# Patient Record
Sex: FEMALE | Race: BLACK OR AFRICAN AMERICAN | Employment: STUDENT | URBAN - METROPOLITAN AREA
[De-identification: names, ages, dates, MRNs, and addresses within clinical notes are randomized per-mention and may not be internally consistent; named-entity substitution may affect disease eponyms.]

---
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
## 2019-01-05 ENCOUNTER — HOSPITAL ENCOUNTER (EMERGENCY)
Age: 19
Discharge: HOME | End: 2019-01-05
Attending: EMERGENCY MEDICINE
Payer: COMMERCIAL

## 2019-01-05 VITALS
OXYGEN SATURATION: 100 % | HEART RATE: 86 BPM | HEIGHT: 61 IN | BODY MASS INDEX: 37.76 KG/M2 | WEIGHT: 200 LBS | TEMPERATURE: 96.9 F | DIASTOLIC BLOOD PRESSURE: 106 MMHG | SYSTOLIC BLOOD PRESSURE: 164 MMHG

## 2019-01-05 DIAGNOSIS — R10.13 ABDOMINAL PAIN, EPIGASTRIC: Primary | ICD-10-CM

## 2019-01-05 LAB
ALBUMIN SERPL-MCNC: 4.5 G/DL (ref 3.5–5.2)
ALP BLD-CCNC: 68 U/L (ref 35–104)
ALT SERPL-CCNC: 12 U/L (ref 0–32)
ANION GAP SERPL CALCULATED.3IONS-SCNC: 12 MMOL/L (ref 7–16)
AST SERPL-CCNC: 25 U/L (ref 0–31)
BASOPHILS ABSOLUTE: 0.05 E9/L (ref 0–0.2)
BASOPHILS RELATIVE PERCENT: 0.6 % (ref 0–2)
BILIRUB SERPL-MCNC: 0.3 MG/DL (ref 0–1.2)
BILIRUBIN URINE: NEGATIVE
BLOOD, URINE: NEGATIVE
BUN BLDV-MCNC: 6 MG/DL (ref 6–20)
CALCIUM SERPL-MCNC: 9.5 MG/DL (ref 8.6–10.2)
CHLORIDE BLD-SCNC: 102 MMOL/L (ref 98–107)
CHP ED QC CHECK: YES
CLARITY: CLEAR
CO2: 23 MMOL/L (ref 22–29)
COLOR: YELLOW
CREAT SERPL-MCNC: 0.6 MG/DL (ref 0.5–1)
EOSINOPHILS ABSOLUTE: 0.07 E9/L (ref 0.05–0.5)
EOSINOPHILS RELATIVE PERCENT: 0.9 % (ref 0–6)
GFR AFRICAN AMERICAN: >60
GFR NON-AFRICAN AMERICAN: >60 ML/MIN/1.73
GLUCOSE BLD-MCNC: 99 MG/DL (ref 74–99)
GLUCOSE URINE: NEGATIVE MG/DL
HCT VFR BLD CALC: 36.6 % (ref 34–48)
HEMOGLOBIN: 12.1 G/DL (ref 11.5–15.5)
IMMATURE GRANULOCYTES #: 0.02 E9/L
IMMATURE GRANULOCYTES %: 0.3 % (ref 0–5)
KETONES, URINE: NEGATIVE MG/DL
LACTIC ACID: 1.3 MMOL/L (ref 0.5–2.2)
LEUKOCYTE ESTERASE, URINE: NEGATIVE
LIPASE: 16 U/L (ref 13–60)
LYMPHOCYTES ABSOLUTE: 2.71 E9/L (ref 1.5–4)
LYMPHOCYTES RELATIVE PERCENT: 34.7 % (ref 20–42)
MCH RBC QN AUTO: 26.7 PG (ref 26–35)
MCHC RBC AUTO-ENTMCNC: 33.1 % (ref 32–34.5)
MCV RBC AUTO: 80.6 FL (ref 80–99.9)
MONOCYTES ABSOLUTE: 0.54 E9/L (ref 0.1–0.95)
MONOCYTES RELATIVE PERCENT: 6.9 % (ref 2–12)
NEUTROPHILS ABSOLUTE: 4.43 E9/L (ref 1.8–7.3)
NEUTROPHILS RELATIVE PERCENT: 56.6 % (ref 43–80)
NITRITE, URINE: NEGATIVE
PDW BLD-RTO: 13.5 FL (ref 11.5–15)
PH UA: 6 (ref 5–9)
PLATELET # BLD: 381 E9/L (ref 130–450)
PMV BLD AUTO: 9.8 FL (ref 7–12)
POTASSIUM SERPL-SCNC: 5.2 MMOL/L (ref 3.5–5)
PREGNANCY TEST URINE, POC: NEGATIVE
PROTEIN UA: NEGATIVE MG/DL
RBC # BLD: 4.54 E12/L (ref 3.5–5.5)
SODIUM BLD-SCNC: 137 MMOL/L (ref 132–146)
SPECIFIC GRAVITY UA: 1.01 (ref 1–1.03)
TOTAL PROTEIN: 8.1 G/DL (ref 6.4–8.3)
UROBILINOGEN, URINE: 0.2 E.U./DL
WBC # BLD: 7.8 E9/L (ref 4.5–11.5)

## 2019-01-05 PROCEDURE — 85025 COMPLETE CBC W/AUTO DIFF WBC: CPT

## 2019-01-05 PROCEDURE — 36415 COLL VENOUS BLD VENIPUNCTURE: CPT

## 2019-01-05 PROCEDURE — 6370000000 HC RX 637 (ALT 250 FOR IP): Performed by: EMERGENCY MEDICINE

## 2019-01-05 PROCEDURE — 83690 ASSAY OF LIPASE: CPT

## 2019-01-05 PROCEDURE — 80053 COMPREHEN METABOLIC PANEL: CPT

## 2019-01-05 PROCEDURE — 81003 URINALYSIS AUTO W/O SCOPE: CPT

## 2019-01-05 PROCEDURE — 83605 ASSAY OF LACTIC ACID: CPT

## 2019-01-05 PROCEDURE — 99284 EMERGENCY DEPT VISIT MOD MDM: CPT

## 2019-01-05 RX ORDER — RIBOFLAVIN (VITAMIN B2) 100 MG
100 TABLET ORAL DAILY
COMMUNITY
End: 2019-10-11

## 2019-01-05 RX ORDER — SUCRALFATE 1 G/1
1 TABLET ORAL 4 TIMES DAILY
Qty: 60 TABLET | Refills: 0 | Status: SHIPPED | OUTPATIENT
Start: 2019-01-05 | End: 2019-10-11

## 2019-01-05 RX ORDER — FAMOTIDINE 20 MG/1
20 TABLET, FILM COATED ORAL 2 TIMES DAILY
Qty: 28 TABLET | Refills: 0 | Status: SHIPPED | OUTPATIENT
Start: 2019-01-05 | End: 2019-10-25

## 2019-01-05 RX ADMIN — LIDOCAINE HYDROCHLORIDE: 20 SOLUTION ORAL; TOPICAL at 20:36

## 2019-01-05 ASSESSMENT — PAIN DESCRIPTION - DESCRIPTORS: DESCRIPTORS: BURNING

## 2019-01-05 ASSESSMENT — PAIN SCALES - GENERAL: PAINLEVEL_OUTOF10: 3

## 2019-01-08 ENCOUNTER — APPOINTMENT (OUTPATIENT)
Dept: GENERAL RADIOLOGY | Age: 19
End: 2019-01-08
Payer: COMMERCIAL

## 2019-01-08 ENCOUNTER — HOSPITAL ENCOUNTER (EMERGENCY)
Age: 19
Discharge: HOME | End: 2019-01-08
Attending: EMERGENCY MEDICINE
Payer: COMMERCIAL

## 2019-01-08 VITALS
HEIGHT: 61 IN | WEIGHT: 200 LBS | DIASTOLIC BLOOD PRESSURE: 98 MMHG | BODY MASS INDEX: 37.76 KG/M2 | OXYGEN SATURATION: 99 % | SYSTOLIC BLOOD PRESSURE: 152 MMHG | TEMPERATURE: 96.9 F | RESPIRATION RATE: 18 BRPM | HEART RATE: 98 BPM

## 2019-01-08 DIAGNOSIS — K21.9 GASTROESOPHAGEAL REFLUX DISEASE, ESOPHAGITIS PRESENCE NOT SPECIFIED: Primary | ICD-10-CM

## 2019-01-08 LAB
ALBUMIN SERPL-MCNC: 4.1 G/DL (ref 3.5–5.2)
ALP BLD-CCNC: 78 U/L (ref 35–104)
ALT SERPL-CCNC: 8 U/L (ref 0–32)
ANION GAP SERPL CALCULATED.3IONS-SCNC: 14 MMOL/L (ref 7–16)
AST SERPL-CCNC: 11 U/L (ref 0–31)
BASOPHILS ABSOLUTE: 0.03 E9/L (ref 0–0.2)
BASOPHILS RELATIVE PERCENT: 0.5 % (ref 0–2)
BILIRUB SERPL-MCNC: 0.2 MG/DL (ref 0–1.2)
BUN BLDV-MCNC: 5 MG/DL (ref 6–20)
CALCIUM SERPL-MCNC: 9.2 MG/DL (ref 8.6–10.2)
CHLORIDE BLD-SCNC: 101 MMOL/L (ref 98–107)
CHP ED QC CHECK: YES
CO2: 21 MMOL/L (ref 22–29)
CREAT SERPL-MCNC: 0.6 MG/DL (ref 0.5–1)
D DIMER: 212 NG/ML DDU
EKG ATRIAL RATE: 89 BPM
EKG P AXIS: 65 DEGREES
EKG P-R INTERVAL: 152 MS
EKG Q-T INTERVAL: 328 MS
EKG QRS DURATION: 70 MS
EKG QTC CALCULATION (BAZETT): 399 MS
EKG R AXIS: 58 DEGREES
EKG T AXIS: 67 DEGREES
EKG VENTRICULAR RATE: 89 BPM
EOSINOPHILS ABSOLUTE: 0.04 E9/L (ref 0.05–0.5)
EOSINOPHILS RELATIVE PERCENT: 0.6 % (ref 0–6)
GFR AFRICAN AMERICAN: >60
GFR NON-AFRICAN AMERICAN: >60 ML/MIN/1.73
GLUCOSE BLD-MCNC: 126 MG/DL (ref 74–99)
HCT VFR BLD CALC: 35.1 % (ref 34–48)
HEMOGLOBIN: 11.7 G/DL (ref 11.5–15.5)
IMMATURE GRANULOCYTES #: 0.01 E9/L
IMMATURE GRANULOCYTES %: 0.2 % (ref 0–5)
LYMPHOCYTES ABSOLUTE: 1.8 E9/L (ref 1.5–4)
LYMPHOCYTES RELATIVE PERCENT: 27.1 % (ref 20–42)
MCH RBC QN AUTO: 27 PG (ref 26–35)
MCHC RBC AUTO-ENTMCNC: 33.3 % (ref 32–34.5)
MCV RBC AUTO: 81.1 FL (ref 80–99.9)
MONOCYTES ABSOLUTE: 0.49 E9/L (ref 0.1–0.95)
MONOCYTES RELATIVE PERCENT: 7.4 % (ref 2–12)
NEUTROPHILS ABSOLUTE: 4.28 E9/L (ref 1.8–7.3)
NEUTROPHILS RELATIVE PERCENT: 64.2 % (ref 43–80)
PDW BLD-RTO: 13.2 FL (ref 11.5–15)
PLATELET # BLD: 383 E9/L (ref 130–450)
PMV BLD AUTO: 9.9 FL (ref 7–12)
POTASSIUM SERPL-SCNC: 4 MMOL/L (ref 3.5–5)
PREGNANCY TEST URINE, POC: NEGATIVE
RBC # BLD: 4.33 E12/L (ref 3.5–5.5)
SODIUM BLD-SCNC: 136 MMOL/L (ref 132–146)
TOTAL PROTEIN: 7.4 G/DL (ref 6.4–8.3)
TROPONIN: <0.01 NG/ML (ref 0–0.03)
WBC # BLD: 6.7 E9/L (ref 4.5–11.5)

## 2019-01-08 PROCEDURE — 71046 X-RAY EXAM CHEST 2 VIEWS: CPT

## 2019-01-08 PROCEDURE — 36415 COLL VENOUS BLD VENIPUNCTURE: CPT

## 2019-01-08 PROCEDURE — 93005 ELECTROCARDIOGRAM TRACING: CPT | Performed by: PHYSICIAN ASSISTANT

## 2019-01-08 PROCEDURE — 80053 COMPREHEN METABOLIC PANEL: CPT

## 2019-01-08 PROCEDURE — 99285 EMERGENCY DEPT VISIT HI MDM: CPT

## 2019-01-08 PROCEDURE — 6370000000 HC RX 637 (ALT 250 FOR IP): Performed by: EMERGENCY MEDICINE

## 2019-01-08 PROCEDURE — 84484 ASSAY OF TROPONIN QUANT: CPT

## 2019-01-08 PROCEDURE — 85378 FIBRIN DEGRADE SEMIQUANT: CPT

## 2019-01-08 PROCEDURE — 85025 COMPLETE CBC W/AUTO DIFF WBC: CPT

## 2019-01-08 RX ADMIN — LIDOCAINE HYDROCHLORIDE: 20 SOLUTION ORAL; TOPICAL at 14:58

## 2019-01-08 ASSESSMENT — PAIN DESCRIPTION - LOCATION: LOCATION: CHEST

## 2019-01-08 ASSESSMENT — PAIN SCALES - GENERAL: PAINLEVEL_OUTOF10: 5

## 2019-01-08 ASSESSMENT — PAIN DESCRIPTION - DESCRIPTORS: DESCRIPTORS: ACHING

## 2019-01-08 ASSESSMENT — PAIN DESCRIPTION - PROGRESSION: CLINICAL_PROGRESSION: GRADUALLY WORSENING

## 2019-01-08 ASSESSMENT — PAIN DESCRIPTION - ORIENTATION: ORIENTATION: MID

## 2019-01-08 ASSESSMENT — PAIN DESCRIPTION - PAIN TYPE: TYPE: ACUTE PAIN

## 2019-01-08 ASSESSMENT — PAIN DESCRIPTION - FREQUENCY: FREQUENCY: INTERMITTENT

## 2019-03-07 ENCOUNTER — HOSPITAL ENCOUNTER (OUTPATIENT)
Age: 19
Discharge: HOME | End: 2019-03-09

## 2019-03-07 LAB — HCG QUALITATIVE: NEGATIVE

## 2019-03-07 PROCEDURE — 84703 CHORIONIC GONADOTROPIN ASSAY: CPT

## 2019-03-14 ENCOUNTER — HOSPITAL ENCOUNTER (OUTPATIENT)
Age: 19
Discharge: HOME | End: 2019-03-16

## 2019-03-14 PROCEDURE — 88304 TISSUE EXAM BY PATHOLOGIST: CPT

## 2019-10-05 ENCOUNTER — APPOINTMENT (OUTPATIENT)
Dept: GENERAL RADIOLOGY | Age: 19
End: 2019-10-05
Payer: COMMERCIAL

## 2019-10-05 ENCOUNTER — HOSPITAL ENCOUNTER (EMERGENCY)
Age: 19
Discharge: HOME | End: 2019-10-05
Payer: COMMERCIAL

## 2019-10-05 VITALS
OXYGEN SATURATION: 99 % | WEIGHT: 200 LBS | HEART RATE: 90 BPM | DIASTOLIC BLOOD PRESSURE: 92 MMHG | SYSTOLIC BLOOD PRESSURE: 148 MMHG | RESPIRATION RATE: 18 BRPM | BODY MASS INDEX: 37.79 KG/M2 | TEMPERATURE: 98 F

## 2019-10-05 DIAGNOSIS — S82.141A TIBIAL PLATEAU FRACTURE, RIGHT, CLOSED, INITIAL ENCOUNTER: Primary | ICD-10-CM

## 2019-10-05 PROCEDURE — 73562 X-RAY EXAM OF KNEE 3: CPT

## 2019-10-05 PROCEDURE — 6370000000 HC RX 637 (ALT 250 FOR IP): Performed by: NURSE PRACTITIONER

## 2019-10-05 PROCEDURE — 99283 EMERGENCY DEPT VISIT LOW MDM: CPT

## 2019-10-05 RX ORDER — ACETAMINOPHEN AND CODEINE PHOSPHATE 300; 30 MG/1; MG/1
1 TABLET ORAL EVERY 6 HOURS PRN
Qty: 18 TABLET | Refills: 0 | Status: SHIPPED | OUTPATIENT
Start: 2019-10-05 | End: 2019-10-08

## 2019-10-05 RX ORDER — ACETAMINOPHEN 500 MG
1000 TABLET ORAL ONCE
Status: COMPLETED | OUTPATIENT
Start: 2019-10-05 | End: 2019-10-05

## 2019-10-05 RX ADMIN — ACETAMINOPHEN 1000 MG: 500 TABLET ORAL at 10:01

## 2019-10-05 ASSESSMENT — PAIN SCALES - GENERAL
PAINLEVEL_OUTOF10: 6
PAINLEVEL_OUTOF10: 7

## 2019-10-07 ENCOUNTER — TELEPHONE (OUTPATIENT)
Dept: ORTHOPEDIC SURGERY | Age: 19
End: 2019-10-07

## 2019-10-11 ENCOUNTER — OFFICE VISIT (OUTPATIENT)
Dept: ORTHOPEDIC SURGERY | Age: 19
End: 2019-10-11
Payer: COMMERCIAL

## 2019-10-11 VITALS
BODY MASS INDEX: 36.8 KG/M2 | DIASTOLIC BLOOD PRESSURE: 89 MMHG | SYSTOLIC BLOOD PRESSURE: 136 MMHG | WEIGHT: 200 LBS | HEIGHT: 62 IN

## 2019-10-11 DIAGNOSIS — M25.561 ACUTE PAIN OF RIGHT KNEE: ICD-10-CM

## 2019-10-11 PROCEDURE — G8417 CALC BMI ABV UP PARAM F/U: HCPCS | Performed by: ORTHOPAEDIC SURGERY

## 2019-10-11 PROCEDURE — 99203 OFFICE O/P NEW LOW 30 MIN: CPT | Performed by: ORTHOPAEDIC SURGERY

## 2019-10-11 PROCEDURE — 1036F TOBACCO NON-USER: CPT | Performed by: ORTHOPAEDIC SURGERY

## 2019-10-11 PROCEDURE — G8427 DOCREV CUR MEDS BY ELIG CLIN: HCPCS | Performed by: ORTHOPAEDIC SURGERY

## 2019-10-11 PROCEDURE — G8484 FLU IMMUNIZE NO ADMIN: HCPCS | Performed by: ORTHOPAEDIC SURGERY

## 2019-10-11 PROCEDURE — 99202 OFFICE O/P NEW SF 15 MIN: CPT | Performed by: ORTHOPAEDIC SURGERY

## 2019-10-22 DIAGNOSIS — M25.561 ACUTE PAIN OF RIGHT KNEE: Primary | ICD-10-CM

## 2019-10-25 ENCOUNTER — HOSPITAL ENCOUNTER (OUTPATIENT)
Dept: GENERAL RADIOLOGY | Age: 19
Discharge: HOME | End: 2019-10-27
Payer: COMMERCIAL

## 2019-10-25 ENCOUNTER — OFFICE VISIT (OUTPATIENT)
Dept: ORTHOPEDIC SURGERY | Age: 19
End: 2019-10-25
Payer: COMMERCIAL

## 2019-10-25 VITALS — RESPIRATION RATE: 18 BRPM | HEIGHT: 62 IN | WEIGHT: 200 LBS | BODY MASS INDEX: 36.8 KG/M2

## 2019-10-25 DIAGNOSIS — S82.131A: Primary | ICD-10-CM

## 2019-10-25 DIAGNOSIS — M25.561 ACUTE PAIN OF RIGHT KNEE: ICD-10-CM

## 2019-10-25 PROCEDURE — 99203 OFFICE O/P NEW LOW 30 MIN: CPT | Performed by: ORTHOPAEDIC SURGERY

## 2019-10-25 PROCEDURE — 73560 X-RAY EXAM OF KNEE 1 OR 2: CPT

## 2019-10-25 PROCEDURE — G8417 CALC BMI ABV UP PARAM F/U: HCPCS | Performed by: ORTHOPAEDIC SURGERY

## 2019-10-25 PROCEDURE — 1036F TOBACCO NON-USER: CPT | Performed by: ORTHOPAEDIC SURGERY

## 2019-10-25 PROCEDURE — 99212 OFFICE O/P EST SF 10 MIN: CPT | Performed by: ORTHOPAEDIC SURGERY

## 2019-10-25 PROCEDURE — G8427 DOCREV CUR MEDS BY ELIG CLIN: HCPCS | Performed by: ORTHOPAEDIC SURGERY

## 2019-10-25 PROCEDURE — G8484 FLU IMMUNIZE NO ADMIN: HCPCS | Performed by: ORTHOPAEDIC SURGERY

## 2019-10-29 PROBLEM — S82.131A: Status: ACTIVE | Noted: 2019-10-29

## 2020-01-27 ENCOUNTER — HOSPITAL ENCOUNTER (OUTPATIENT)
Dept: GENERAL RADIOLOGY | Age: 20
Discharge: HOME | End: 2020-01-29
Payer: MEDICAID

## 2020-01-27 ENCOUNTER — OFFICE VISIT (OUTPATIENT)
Dept: ORTHOPEDIC SURGERY | Age: 20
End: 2020-01-27
Payer: MEDICAID

## 2020-01-27 VITALS
DIASTOLIC BLOOD PRESSURE: 84 MMHG | RESPIRATION RATE: 18 BRPM | HEIGHT: 62 IN | BODY MASS INDEX: 36.8 KG/M2 | SYSTOLIC BLOOD PRESSURE: 122 MMHG | HEART RATE: 83 BPM | WEIGHT: 200 LBS

## 2020-01-27 PROCEDURE — 73562 X-RAY EXAM OF KNEE 3: CPT

## 2020-01-27 PROCEDURE — 99212 OFFICE O/P EST SF 10 MIN: CPT | Performed by: PHYSICIAN ASSISTANT

## 2020-01-27 PROCEDURE — 99213 OFFICE O/P EST LOW 20 MIN: CPT | Performed by: NURSE PRACTITIONER

## 2020-01-27 PROCEDURE — 1036F TOBACCO NON-USER: CPT | Performed by: NURSE PRACTITIONER

## 2020-01-27 PROCEDURE — G8427 DOCREV CUR MEDS BY ELIG CLIN: HCPCS | Performed by: NURSE PRACTITIONER

## 2020-01-27 PROCEDURE — G8484 FLU IMMUNIZE NO ADMIN: HCPCS | Performed by: NURSE PRACTITIONER

## 2020-01-27 PROCEDURE — G8417 CALC BMI ABV UP PARAM F/U: HCPCS | Performed by: NURSE PRACTITIONER

## 2020-01-31 NOTE — PATIENT INSTRUCTIONS
Continue activity and weight bearing as tolerated. OTC analgesics as needed for pain control  Ice and elevation if needed for pain or swelling control  Follow up in 3 months, anticipate this being your last appointment if you continue to do well.    Call sooner with any problems or ocncerns

## 2020-04-08 ENCOUNTER — TELEPHONE (OUTPATIENT)
Dept: ORTHOPEDIC SURGERY | Age: 20
End: 2020-04-08

## 2020-04-08 NOTE — TELEPHONE ENCOUNTER
Called patient to reschedule appointment for 4/30 due to COVID-19 virus, spoke to her mother Rosey Ward. Will call back to reschedule appointment when restrictions are lifted. She verbalized understanding and encouraged to call office with any concerns.

## 2020-06-20 ENCOUNTER — HOSPITAL ENCOUNTER (EMERGENCY)
Age: 20
Discharge: HOME | End: 2020-06-20
Attending: EMERGENCY MEDICINE
Payer: MEDICAID

## 2020-06-20 ENCOUNTER — APPOINTMENT (OUTPATIENT)
Dept: GENERAL RADIOLOGY | Age: 20
End: 2020-06-20
Payer: MEDICAID

## 2020-06-20 VITALS
DIASTOLIC BLOOD PRESSURE: 80 MMHG | HEART RATE: 97 BPM | OXYGEN SATURATION: 98 % | TEMPERATURE: 97.1 F | SYSTOLIC BLOOD PRESSURE: 118 MMHG | RESPIRATION RATE: 14 BRPM

## 2020-06-20 LAB
ALBUMIN SERPL-MCNC: 4.4 G/DL (ref 3.5–5.2)
ALP BLD-CCNC: 126 U/L (ref 35–104)
ALT SERPL-CCNC: 5 U/L (ref 0–32)
ANION GAP SERPL CALCULATED.3IONS-SCNC: 17 MMOL/L (ref 7–16)
AST SERPL-CCNC: 7 U/L (ref 0–31)
BASOPHILS ABSOLUTE: 0.03 E9/L (ref 0–0.2)
BASOPHILS RELATIVE PERCENT: 0.4 % (ref 0–2)
BETA-HYDROXYBUTYRATE: 3.09 MMOL/L (ref 0.02–0.27)
BILIRUB SERPL-MCNC: 0.2 MG/DL (ref 0–1.2)
BILIRUBIN URINE: NEGATIVE
BLOOD, URINE: NEGATIVE
BUN BLDV-MCNC: 8 MG/DL (ref 6–20)
CALCIUM SERPL-MCNC: 9.7 MG/DL (ref 8.6–10.2)
CHLORIDE BLD-SCNC: 99 MMOL/L (ref 98–107)
CHP ED QC CHECK: NORMAL
CLARITY: CLEAR
CO2: 18 MMOL/L (ref 22–29)
COLOR: ABNORMAL
CREAT SERPL-MCNC: 0.6 MG/DL (ref 0.5–1)
EOSINOPHILS ABSOLUTE: 0.05 E9/L (ref 0.05–0.5)
EOSINOPHILS RELATIVE PERCENT: 0.7 % (ref 0–6)
GFR AFRICAN AMERICAN: >60
GFR NON-AFRICAN AMERICAN: >60 ML/MIN/1.73
GLUCOSE BLD-MCNC: 376 MG/DL
GLUCOSE BLD-MCNC: 516 MG/DL (ref 74–99)
GLUCOSE URINE: >=1000 MG/DL
HCT VFR BLD CALC: 41.3 % (ref 34–48)
HEMOGLOBIN: 13.8 G/DL (ref 11.5–15.5)
IMMATURE GRANULOCYTES #: 0.04 E9/L
IMMATURE GRANULOCYTES %: 0.5 % (ref 0–5)
KETONES, URINE: >=80 MG/DL
LEUKOCYTE ESTERASE, URINE: NEGATIVE
LYMPHOCYTES ABSOLUTE: 2 E9/L (ref 1.5–4)
LYMPHOCYTES RELATIVE PERCENT: 26.1 % (ref 20–42)
MCH RBC QN AUTO: 26.9 PG (ref 26–35)
MCHC RBC AUTO-ENTMCNC: 33.4 % (ref 32–34.5)
MCV RBC AUTO: 80.5 FL (ref 80–99.9)
METER GLUCOSE: 376 MG/DL (ref 74–99)
MONOCYTES ABSOLUTE: 0.61 E9/L (ref 0.1–0.95)
MONOCYTES RELATIVE PERCENT: 8 % (ref 2–12)
NEUTROPHILS ABSOLUTE: 4.94 E9/L (ref 1.8–7.3)
NEUTROPHILS RELATIVE PERCENT: 64.3 % (ref 43–80)
NITRITE, URINE: NEGATIVE
PDW BLD-RTO: 12.8 FL (ref 11.5–15)
PH UA: 6 (ref 5–9)
PH VENOUS: 7.32 (ref 7.35–7.45)
PLATELET # BLD: 352 E9/L (ref 130–450)
PMV BLD AUTO: 10.7 FL (ref 7–12)
POTASSIUM SERPL-SCNC: 4.7 MMOL/L (ref 3.5–5)
PROTEIN UA: NEGATIVE MG/DL
RBC # BLD: 5.13 E12/L (ref 3.5–5.5)
SODIUM BLD-SCNC: 134 MMOL/L (ref 132–146)
SPECIFIC GRAVITY UA: 1.01 (ref 1–1.03)
TOTAL PROTEIN: 7.9 G/DL (ref 6.4–8.3)
TROPONIN: <0.01 NG/ML (ref 0–0.03)
UROBILINOGEN, URINE: 0.2 E.U./DL
WBC # BLD: 7.7 E9/L (ref 4.5–11.5)

## 2020-06-20 PROCEDURE — 85025 COMPLETE CBC W/AUTO DIFF WBC: CPT

## 2020-06-20 PROCEDURE — 36415 COLL VENOUS BLD VENIPUNCTURE: CPT

## 2020-06-20 PROCEDURE — 96360 HYDRATION IV INFUSION INIT: CPT

## 2020-06-20 PROCEDURE — 6370000000 HC RX 637 (ALT 250 FOR IP): Performed by: EMERGENCY MEDICINE

## 2020-06-20 PROCEDURE — 82800 BLOOD PH: CPT

## 2020-06-20 PROCEDURE — 82962 GLUCOSE BLOOD TEST: CPT

## 2020-06-20 PROCEDURE — 71045 X-RAY EXAM CHEST 1 VIEW: CPT

## 2020-06-20 PROCEDURE — 96361 HYDRATE IV INFUSION ADD-ON: CPT

## 2020-06-20 PROCEDURE — 81003 URINALYSIS AUTO W/O SCOPE: CPT

## 2020-06-20 PROCEDURE — 87088 URINE BACTERIA CULTURE: CPT

## 2020-06-20 PROCEDURE — 80053 COMPREHEN METABOLIC PANEL: CPT

## 2020-06-20 PROCEDURE — 2580000003 HC RX 258: Performed by: EMERGENCY MEDICINE

## 2020-06-20 PROCEDURE — 93005 ELECTROCARDIOGRAM TRACING: CPT | Performed by: EMERGENCY MEDICINE

## 2020-06-20 PROCEDURE — 84484 ASSAY OF TROPONIN QUANT: CPT

## 2020-06-20 PROCEDURE — 82010 KETONE BODYS QUAN: CPT

## 2020-06-20 PROCEDURE — 99285 EMERGENCY DEPT VISIT HI MDM: CPT

## 2020-06-20 RX ORDER — 0.9 % SODIUM CHLORIDE 0.9 %
1000 INTRAVENOUS SOLUTION INTRAVENOUS ONCE
Status: COMPLETED | OUTPATIENT
Start: 2020-06-20 | End: 2020-06-20

## 2020-06-20 RX ADMIN — SODIUM CHLORIDE 1000 ML: 9 INJECTION, SOLUTION INTRAVENOUS at 18:20

## 2020-06-20 RX ADMIN — SODIUM CHLORIDE 1000 ML: 9 INJECTION, SOLUTION INTRAVENOUS at 16:53

## 2020-06-20 RX ADMIN — METFORMIN HYDROCHLORIDE 500 MG: 500 TABLET ORAL at 18:49

## 2020-06-20 RX ADMIN — LIDOCAINE HYDROCHLORIDE: 20 SOLUTION ORAL; TOPICAL at 15:56

## 2020-06-20 ASSESSMENT — ENCOUNTER SYMPTOMS
ABDOMINAL DISTENTION: 0
SINUS PRESSURE: 0
BACK PAIN: 0
COUGH: 0
EYE PAIN: 0
EYE DISCHARGE: 0
DIARRHEA: 0
VOMITING: 0
NAUSEA: 0
SHORTNESS OF BREATH: 0
SORE THROAT: 0
WHEEZING: 0
EYE REDNESS: 0

## 2020-06-20 ASSESSMENT — PAIN DESCRIPTION - PAIN TYPE: TYPE: ACUTE PAIN

## 2020-06-20 ASSESSMENT — PAIN SCALES - GENERAL: PAINLEVEL_OUTOF10: 6

## 2020-06-20 ASSESSMENT — PAIN DESCRIPTION - LOCATION: LOCATION: CHEST

## 2020-06-20 NOTE — ED PROVIDER NOTES
abdominal tenderness. There is no guarding or rebound. Comments: Epigastric palpation without rebound or guarding. Skin:     General: Skin is warm and dry. Neurological:      Mental Status: She is alert and oriented to person, place, and time. Cranial Nerves: No cranial nerve deficit. Coordination: Coordination normal.          Procedures     MDM  Number of Diagnoses or Management Options  Dehydration:   Hyperglycemia:   Ketosis due to diabetes [facility]):   Diagnosis management comments: 62-year-old female presenting with midsternal chest pain after eating today. She thought she had GERD was using over-the-counter medications to treat it. Patient did have some epigastric and midsternal chest tenderness to palpation. Initial EKG showed mild tachycardia but otherwise was unremarkable. However, given the patient's discussion of her medications, she does have a history of type 2 diabetes, presumed hypertension and hyperlipidemia, making her higher risk than the average 21year-old for coronary issue. Labs were obtained and was noted the patient was hyperglycemic. On further discussion, the patient admitted to not taking her metformin as scheduled and so further work-up was undertaken to make sure the patient was not converting to DKA. The patient was ketotic but not acidotic. Metformin was dosed per her home regimen, she has not taken any in the last day. Fluids were given and the patient's blood sugar improved. A1c was deferred to her PCP and it was recommended the patient follow-up with her PCP first thing on Monday morning, as medication nonadherence could cause her to have significantly high risk of morbidity and mortality the course of her lifetime. Patient was agreeable with this plan is to follow-up with her PCP on Monday. She is felt stable for discharge at this time.            --------------------------------------------- PAST HISTORY ---------------------------------------------  Past Medical History:  has a past medical history of Hypothyroid. Past Surgical History:  has no past surgical history on file. Social History:  reports that she has never smoked. She has never used smokeless tobacco. She reports that she does not drink alcohol or use drugs. Family History: family history is not on file. The patients home medications have been reviewed. Allergies: Patient has no known allergies.     -------------------------------------------------- RESULTS -------------------------------------------------  Labs:  Results for orders placed or performed during the hospital encounter of 06/20/20   CBC Auto Differential   Result Value Ref Range    WBC 7.7 4.5 - 11.5 E9/L    RBC 5.13 3.50 - 5.50 E12/L    Hemoglobin 13.8 11.5 - 15.5 g/dL    Hematocrit 41.3 34.0 - 48.0 %    MCV 80.5 80.0 - 99.9 fL    MCH 26.9 26.0 - 35.0 pg    MCHC 33.4 32.0 - 34.5 %    RDW 12.8 11.5 - 15.0 fL    Platelets 005 256 - 540 E9/L    MPV 10.7 7.0 - 12.0 fL    Neutrophils % 64.3 43.0 - 80.0 %    Immature Granulocytes % 0.5 0.0 - 5.0 %    Lymphocytes % 26.1 20.0 - 42.0 %    Monocytes % 8.0 2.0 - 12.0 %    Eosinophils % 0.7 0.0 - 6.0 %    Basophils % 0.4 0.0 - 2.0 %    Neutrophils Absolute 4.94 1.80 - 7.30 E9/L    Immature Granulocytes # 0.04 E9/L    Lymphocytes Absolute 2.00 1.50 - 4.00 E9/L    Monocytes Absolute 0.61 0.10 - 0.95 E9/L    Eosinophils Absolute 0.05 0.05 - 0.50 E9/L    Basophils Absolute 0.03 0.00 - 0.20 E9/L   Comprehensive Metabolic Panel   Result Value Ref Range    Sodium 134 132 - 146 mmol/L    Potassium 4.7 3.5 - 5.0 mmol/L    Chloride 99 98 - 107 mmol/L    CO2 18 (L) 22 - 29 mmol/L    Anion Gap 17 (H) 7 - 16 mmol/L    Glucose 516 (HH) 74 - 99 mg/dL    BUN 8 6 - 20 mg/dL    CREATININE 0.6 0.5 - 1.0 mg/dL    GFR Non-African American >60 >=60 mL/min/1.73    GFR African American >60     Calcium 9.7 8.6 - 10.2 mg/dL    Total Protein 7.9 6.4 - 8.3 g/dL Alb 4.4 3.5 - 5.2 g/dL    Total Bilirubin 0.2 0.0 - 1.2 mg/dL    Alkaline Phosphatase 126 (H) 35 - 104 U/L    ALT 5 0 - 32 U/L    AST 7 0 - 31 U/L   Troponin   Result Value Ref Range    Troponin <0.01 0.00 - 0.03 ng/mL   pH, venous   Result Value Ref Range    pH, Jasvir 7.32 (L) 7.35 - 7.45   Urinalysis, reflex to microscopic   Result Value Ref Range    Color, UA Straw Straw/Yellow    Clarity, UA Clear Clear    Glucose, Ur >=1000 (A) Negative mg/dL    Bilirubin Urine Negative Negative    Ketones, Urine >=80 (A) Negative mg/dL    Specific Gravity, UA 1.010 1.005 - 1.030    Blood, Urine Negative Negative    pH, UA 6.0 5.0 - 9.0    Protein, UA Negative Negative mg/dL    Urobilinogen, Urine 0.2 <2.0 E.U./dL    Nitrite, Urine Negative Negative    Leukocyte Esterase, Urine Negative Negative   Beta-Hydroxybutyrate   Result Value Ref Range    Beta-Hydroxybutyrate 3.09 (H) 0.02 - 0.27 mmol/L   POCT Glucose   Result Value Ref Range    Glucose 376 mg/dL    QC OK? ok    POCT Glucose   Result Value Ref Range    Meter Glucose 376 (H) 74 - 99 mg/dL   EKG 12 Lead   Result Value Ref Range    Ventricular Rate 101 BPM    Atrial Rate 101 BPM    P-R Interval 148 ms    QRS Duration 72 ms    Q-T Interval 318 ms    QTc Calculation (Bazett) 412 ms    P Axis 50 degrees    R Axis 50 degrees    T Axis 41 degrees       Radiology:  XR CHEST PORTABLE   Final Result   No acute cardiopulmonary abnormality                   EKG:  This EKG is signed and interpreted by me. Rate: 101  Rhythm: Sinus  Interpretation: sinus tachycardia  Comparison: stable as compared to patient's most recent EKG    ------------------------- NURSING NOTES AND VITALS REVIEWED ---------------------------  Date / Time Roomed:  6/20/2020  3:39 PM  ED Bed Assignment:  26/26    The nursing notes within the ED encounter and vital signs as below have been reviewed.    /80   Pulse 97   Temp 97.1 °F (36.2 °C) (Temporal)   Resp 14   SpO2 98%   Oxygen Saturation

## 2020-06-21 LAB
EKG ATRIAL RATE: 101 BPM
EKG P AXIS: 50 DEGREES
EKG P-R INTERVAL: 148 MS
EKG Q-T INTERVAL: 318 MS
EKG QRS DURATION: 72 MS
EKG QTC CALCULATION (BAZETT): 412 MS
EKG R AXIS: 50 DEGREES
EKG T AXIS: 41 DEGREES
EKG VENTRICULAR RATE: 101 BPM

## 2020-06-21 PROCEDURE — 93010 ELECTROCARDIOGRAM REPORT: CPT | Performed by: INTERNAL MEDICINE

## 2020-06-22 ENCOUNTER — HOSPITAL ENCOUNTER (OUTPATIENT)
Age: 20
Setting detail: OBSERVATION
Discharge: HOME | End: 2020-06-25
Attending: EMERGENCY MEDICINE | Admitting: INTERNAL MEDICINE
Payer: MEDICAID

## 2020-06-22 LAB
ALBUMIN SERPL-MCNC: 4.2 G/DL (ref 3.5–5.2)
ALP BLD-CCNC: 89 U/L (ref 35–104)
ALT SERPL-CCNC: <5 U/L (ref 0–32)
ANION GAP SERPL CALCULATED.3IONS-SCNC: 22 MMOL/L (ref 7–16)
AST SERPL-CCNC: 11 U/L (ref 0–31)
BACTERIA: ABNORMAL /HPF
BASOPHILS ABSOLUTE: 0.07 E9/L (ref 0–0.2)
BASOPHILS RELATIVE PERCENT: 0.7 % (ref 0–2)
BETA-HYDROXYBUTYRATE: >4.5 MMOL/L (ref 0.02–0.27)
BILIRUB SERPL-MCNC: 0.3 MG/DL (ref 0–1.2)
BILIRUBIN URINE: ABNORMAL
BLOOD, URINE: ABNORMAL
BUN BLDV-MCNC: 3 MG/DL (ref 6–20)
CALCIUM SERPL-MCNC: 9.1 MG/DL (ref 8.6–10.2)
CHLORIDE BLD-SCNC: 101 MMOL/L (ref 98–107)
CLARITY: CLEAR
CO2: 12 MMOL/L (ref 22–29)
COLOR: YELLOW
CREAT SERPL-MCNC: 0.5 MG/DL (ref 0.5–1)
EOSINOPHILS ABSOLUTE: 0.01 E9/L (ref 0.05–0.5)
EOSINOPHILS RELATIVE PERCENT: 0.1 % (ref 0–6)
EPITHELIAL CELLS, UA: ABNORMAL /HPF
GFR AFRICAN AMERICAN: >60
GFR NON-AFRICAN AMERICAN: >60 ML/MIN/1.73
GLUCOSE BLD-MCNC: 283 MG/DL (ref 74–99)
GLUCOSE URINE: 250 MG/DL
HCT VFR BLD CALC: 43.7 % (ref 34–48)
HEMOGLOBIN: 14.6 G/DL (ref 11.5–15.5)
IMMATURE GRANULOCYTES #: 0.05 E9/L
IMMATURE GRANULOCYTES %: 0.5 % (ref 0–5)
KETONES, URINE: >=80 MG/DL
LACTIC ACID: 1 MMOL/L (ref 0.5–2.2)
LEUKOCYTE ESTERASE, URINE: NEGATIVE
LIPASE: 16 U/L (ref 13–60)
LYMPHOCYTES ABSOLUTE: 1.66 E9/L (ref 1.5–4)
LYMPHOCYTES RELATIVE PERCENT: 17.6 % (ref 20–42)
MCH RBC QN AUTO: 26.7 PG (ref 26–35)
MCHC RBC AUTO-ENTMCNC: 33.4 % (ref 32–34.5)
MCV RBC AUTO: 80 FL (ref 80–99.9)
METER GLUCOSE: 282 MG/DL (ref 74–99)
MONOCYTES ABSOLUTE: 0.75 E9/L (ref 0.1–0.95)
MONOCYTES RELATIVE PERCENT: 8 % (ref 2–12)
NEUTROPHILS ABSOLUTE: 6.87 E9/L (ref 1.8–7.3)
NEUTROPHILS RELATIVE PERCENT: 73.1 % (ref 43–80)
NITRITE, URINE: NEGATIVE
PDW BLD-RTO: 13.2 FL (ref 11.5–15)
PH UA: 6 (ref 5–9)
PH VENOUS: 7.33 (ref 7.35–7.45)
PLATELET # BLD: 400 E9/L (ref 130–450)
PMV BLD AUTO: 10.4 FL (ref 7–12)
POTASSIUM SERPL-SCNC: 4 MMOL/L (ref 3.5–5)
PROTEIN UA: ABNORMAL MG/DL
RBC # BLD: 5.46 E12/L (ref 3.5–5.5)
RBC UA: ABNORMAL /HPF (ref 0–2)
SODIUM BLD-SCNC: 135 MMOL/L (ref 132–146)
SPECIFIC GRAVITY UA: >=1.03 (ref 1–1.03)
TOTAL PROTEIN: 7.5 G/DL (ref 6.4–8.3)
URINE CULTURE, ROUTINE: NORMAL
UROBILINOGEN, URINE: 0.2 E.U./DL
WBC # BLD: 9.4 E9/L (ref 4.5–11.5)
WBC UA: ABNORMAL /HPF (ref 0–5)

## 2020-06-22 PROCEDURE — 2580000003 HC RX 258: Performed by: NURSE PRACTITIONER

## 2020-06-22 PROCEDURE — 83690 ASSAY OF LIPASE: CPT

## 2020-06-22 PROCEDURE — 83605 ASSAY OF LACTIC ACID: CPT

## 2020-06-22 PROCEDURE — 80053 COMPREHEN METABOLIC PANEL: CPT

## 2020-06-22 PROCEDURE — 82962 GLUCOSE BLOOD TEST: CPT

## 2020-06-22 PROCEDURE — 99285 EMERGENCY DEPT VISIT HI MDM: CPT

## 2020-06-22 PROCEDURE — 81001 URINALYSIS AUTO W/SCOPE: CPT

## 2020-06-22 PROCEDURE — 87088 URINE BACTERIA CULTURE: CPT

## 2020-06-22 PROCEDURE — 96374 THER/PROPH/DIAG INJ IV PUSH: CPT

## 2020-06-22 PROCEDURE — 82800 BLOOD PH: CPT

## 2020-06-22 PROCEDURE — 81025 URINE PREGNANCY TEST: CPT

## 2020-06-22 PROCEDURE — 6360000002 HC RX W HCPCS: Performed by: NURSE PRACTITIONER

## 2020-06-22 PROCEDURE — 82010 KETONE BODYS QUAN: CPT

## 2020-06-22 PROCEDURE — 85025 COMPLETE CBC W/AUTO DIFF WBC: CPT

## 2020-06-22 PROCEDURE — 94761 N-INVAS EAR/PLS OXIMETRY MLT: CPT

## 2020-06-22 PROCEDURE — 96361 HYDRATE IV INFUSION ADD-ON: CPT

## 2020-06-22 RX ORDER — ONDANSETRON 2 MG/ML
4 INJECTION INTRAMUSCULAR; INTRAVENOUS ONCE
Status: COMPLETED | OUTPATIENT
Start: 2020-06-22 | End: 2020-06-22

## 2020-06-22 RX ORDER — 0.9 % SODIUM CHLORIDE 0.9 %
1000 INTRAVENOUS SOLUTION INTRAVENOUS ONCE
Status: COMPLETED | OUTPATIENT
Start: 2020-06-22 | End: 2020-06-23

## 2020-06-22 RX ORDER — 0.9 % SODIUM CHLORIDE 0.9 %
1000 INTRAVENOUS SOLUTION INTRAVENOUS ONCE
Status: COMPLETED | OUTPATIENT
Start: 2020-06-23 | End: 2020-06-23

## 2020-06-22 RX ADMIN — SODIUM CHLORIDE 1000 ML: 9 INJECTION, SOLUTION INTRAVENOUS at 23:11

## 2020-06-22 RX ADMIN — ONDANSETRON 4 MG: 2 INJECTION INTRAMUSCULAR; INTRAVENOUS at 23:11

## 2020-06-22 ASSESSMENT — PAIN SCALES - GENERAL: PAINLEVEL_OUTOF10: 5

## 2020-06-22 ASSESSMENT — PAIN DESCRIPTION - PAIN TYPE: TYPE: ACUTE PAIN

## 2020-06-22 ASSESSMENT — PAIN DESCRIPTION - LOCATION: LOCATION: ABDOMEN

## 2020-06-23 PROBLEM — E03.9 HYPOTHYROID: Status: ACTIVE | Noted: 2020-06-23

## 2020-06-23 PROBLEM — E66.9 CLASS 2 OBESITY IN ADULT: Status: ACTIVE | Noted: 2020-06-23

## 2020-06-23 PROBLEM — Z91.148 NONCOMPLIANCE WITH MEDICATION REGIMEN: Status: ACTIVE | Noted: 2020-06-23

## 2020-06-23 PROBLEM — E11.65 UNCONTROLLED TYPE 2 DIABETES MELLITUS WITH HYPERGLYCEMIA (HCC): Status: ACTIVE | Noted: 2020-06-23

## 2020-06-23 PROBLEM — Z91.14 NONCOMPLIANCE WITH MEDICATION REGIMEN: Status: ACTIVE | Noted: 2020-06-23

## 2020-06-23 PROBLEM — E11.65 HYPERGLYCEMIA DUE TO TYPE 2 DIABETES MELLITUS (HCC): Status: ACTIVE | Noted: 2020-06-23

## 2020-06-23 LAB
ANION GAP SERPL CALCULATED.3IONS-SCNC: 14 MMOL/L (ref 7–16)
BUN BLDV-MCNC: 3 MG/DL (ref 6–20)
CALCIUM SERPL-MCNC: 8 MG/DL (ref 8.6–10.2)
CHLORIDE BLD-SCNC: 107 MMOL/L (ref 98–107)
CO2: 15 MMOL/L (ref 22–29)
CREAT SERPL-MCNC: 0.5 MG/DL (ref 0.5–1)
EKG ATRIAL RATE: 78 BPM
EKG P AXIS: 51 DEGREES
EKG P-R INTERVAL: 154 MS
EKG Q-T INTERVAL: 350 MS
EKG QRS DURATION: 72 MS
EKG QTC CALCULATION (BAZETT): 399 MS
EKG R AXIS: 54 DEGREES
EKG T AXIS: 39 DEGREES
EKG VENTRICULAR RATE: 78 BPM
GFR AFRICAN AMERICAN: >60
GFR NON-AFRICAN AMERICAN: >60 ML/MIN/1.73
GLUCOSE BLD-MCNC: 222 MG/DL (ref 74–99)
HBA1C MFR BLD: 13.1 % (ref 4–5.6)
HCG(URINE) PREGNANCY TEST: NEGATIVE
METER GLUCOSE: 220 MG/DL (ref 74–99)
METER GLUCOSE: 225 MG/DL (ref 74–99)
METER GLUCOSE: 237 MG/DL (ref 74–99)
METER GLUCOSE: 284 MG/DL (ref 74–99)
POTASSIUM SERPL-SCNC: 3.7 MMOL/L (ref 3.5–5)
SODIUM BLD-SCNC: 136 MMOL/L (ref 132–146)

## 2020-06-23 PROCEDURE — 6370000000 HC RX 637 (ALT 250 FOR IP): Performed by: NURSE PRACTITIONER

## 2020-06-23 PROCEDURE — 6370000000 HC RX 637 (ALT 250 FOR IP): Performed by: INTERNAL MEDICINE

## 2020-06-23 PROCEDURE — 93010 ELECTROCARDIOGRAM REPORT: CPT | Performed by: INTERNAL MEDICINE

## 2020-06-23 PROCEDURE — 2580000003 HC RX 258: Performed by: NURSE PRACTITIONER

## 2020-06-23 PROCEDURE — 96361 HYDRATE IV INFUSION ADD-ON: CPT

## 2020-06-23 PROCEDURE — 82962 GLUCOSE BLOOD TEST: CPT

## 2020-06-23 PROCEDURE — 83036 HEMOGLOBIN GLYCOSYLATED A1C: CPT

## 2020-06-23 PROCEDURE — 93005 ELECTROCARDIOGRAM TRACING: CPT | Performed by: INTERNAL MEDICINE

## 2020-06-23 PROCEDURE — G0378 HOSPITAL OBSERVATION PER HR: HCPCS

## 2020-06-23 PROCEDURE — 96375 TX/PRO/DX INJ NEW DRUG ADDON: CPT

## 2020-06-23 PROCEDURE — 6360000002 HC RX W HCPCS: Performed by: NURSE PRACTITIONER

## 2020-06-23 PROCEDURE — 80048 BASIC METABOLIC PNL TOTAL CA: CPT

## 2020-06-23 PROCEDURE — 2500000003 HC RX 250 WO HCPCS: Performed by: NURSE PRACTITIONER

## 2020-06-23 PROCEDURE — 2580000003 HC RX 258: Performed by: INTERNAL MEDICINE

## 2020-06-23 PROCEDURE — 96372 THER/PROPH/DIAG INJ SC/IM: CPT

## 2020-06-23 PROCEDURE — 36415 COLL VENOUS BLD VENIPUNCTURE: CPT

## 2020-06-23 RX ORDER — DEXTROSE MONOHYDRATE 50 MG/ML
100 INJECTION, SOLUTION INTRAVENOUS PRN
Status: DISCONTINUED | OUTPATIENT
Start: 2020-06-23 | End: 2020-06-25 | Stop reason: HOSPADM

## 2020-06-23 RX ORDER — DEXTROSE MONOHYDRATE 25 G/50ML
12.5 INJECTION, SOLUTION INTRAVENOUS PRN
Status: DISCONTINUED | OUTPATIENT
Start: 2020-06-23 | End: 2020-06-25 | Stop reason: HOSPADM

## 2020-06-23 RX ORDER — ONDANSETRON 2 MG/ML
4 INJECTION INTRAMUSCULAR; INTRAVENOUS EVERY 6 HOURS PRN
Status: DISCONTINUED | OUTPATIENT
Start: 2020-06-23 | End: 2020-06-25 | Stop reason: HOSPADM

## 2020-06-23 RX ORDER — ATORVASTATIN CALCIUM 10 MG/1
10 TABLET, FILM COATED ORAL NIGHTLY
Status: DISCONTINUED | OUTPATIENT
Start: 2020-06-23 | End: 2020-06-25 | Stop reason: HOSPADM

## 2020-06-23 RX ORDER — LISINOPRIL 5 MG/1
2.5 TABLET ORAL DAILY
Status: DISCONTINUED | OUTPATIENT
Start: 2020-06-23 | End: 2020-06-25 | Stop reason: HOSPADM

## 2020-06-23 RX ORDER — NICOTINE POLACRILEX 4 MG
15 LOZENGE BUCCAL PRN
Status: DISCONTINUED | OUTPATIENT
Start: 2020-06-23 | End: 2020-06-25 | Stop reason: HOSPADM

## 2020-06-23 RX ORDER — ACETAMINOPHEN 325 MG/1
650 TABLET ORAL EVERY 4 HOURS PRN
Status: DISCONTINUED | OUTPATIENT
Start: 2020-06-23 | End: 2020-06-25 | Stop reason: HOSPADM

## 2020-06-23 RX ORDER — ATORVASTATIN CALCIUM 10 MG/1
10 TABLET, FILM COATED ORAL NIGHTLY
COMMUNITY
End: 2022-03-31

## 2020-06-23 RX ORDER — SODIUM CHLORIDE 9 MG/ML
INJECTION, SOLUTION INTRAVENOUS CONTINUOUS
Status: DISCONTINUED | OUTPATIENT
Start: 2020-06-23 | End: 2020-06-25 | Stop reason: HOSPADM

## 2020-06-23 RX ORDER — 0.9 % SODIUM CHLORIDE 0.9 %
1000 INTRAVENOUS SOLUTION INTRAVENOUS ONCE
Status: COMPLETED | OUTPATIENT
Start: 2020-06-23 | End: 2020-06-23

## 2020-06-23 RX ORDER — LISINOPRIL 2.5 MG/1
2.5 TABLET ORAL DAILY
COMMUNITY
End: 2022-03-31

## 2020-06-23 RX ORDER — SODIUM CHLORIDE 0.9 % (FLUSH) 0.9 %
10 SYRINGE (ML) INJECTION EVERY 12 HOURS SCHEDULED
Status: DISCONTINUED | OUTPATIENT
Start: 2020-06-23 | End: 2020-06-25 | Stop reason: HOSPADM

## 2020-06-23 RX ORDER — NORGESTIMATE AND ETHINYL ESTRADIOL 0.25-0.035
1 KIT ORAL DAILY
Status: DISCONTINUED | OUTPATIENT
Start: 2020-06-23 | End: 2020-06-25 | Stop reason: HOSPADM

## 2020-06-23 RX ORDER — SODIUM CHLORIDE 0.9 % (FLUSH) 0.9 %
10 SYRINGE (ML) INJECTION PRN
Status: DISCONTINUED | OUTPATIENT
Start: 2020-06-23 | End: 2020-06-25 | Stop reason: HOSPADM

## 2020-06-23 RX ADMIN — SODIUM CHLORIDE: 9 INJECTION, SOLUTION INTRAVENOUS at 23:15

## 2020-06-23 RX ADMIN — INSULIN LISPRO 2 UNITS: 100 INJECTION, SOLUTION INTRAVENOUS; SUBCUTANEOUS at 17:56

## 2020-06-23 RX ADMIN — SODIUM CHLORIDE, PRESERVATIVE FREE 10 ML: 5 INJECTION INTRAVENOUS at 09:06

## 2020-06-23 RX ADMIN — CEFTRIAXONE SODIUM 1 G: 1 INJECTION, POWDER, FOR SOLUTION INTRAMUSCULAR; INTRAVENOUS at 02:36

## 2020-06-23 RX ADMIN — INSULIN LISPRO 2 UNITS: 100 INJECTION, SOLUTION INTRAVENOUS; SUBCUTANEOUS at 09:02

## 2020-06-23 RX ADMIN — SODIUM CHLORIDE 1000 ML: 9 INJECTION, SOLUTION INTRAVENOUS at 00:01

## 2020-06-23 RX ADMIN — ENOXAPARIN SODIUM 40 MG: 40 INJECTION SUBCUTANEOUS at 09:02

## 2020-06-23 RX ADMIN — LISINOPRIL 2.5 MG: 5 TABLET ORAL at 09:02

## 2020-06-23 RX ADMIN — INSULIN HUMAN 5 UNITS: 100 INJECTION, SOLUTION PARENTERAL at 00:02

## 2020-06-23 RX ADMIN — ACETAMINOPHEN 650 MG: 325 TABLET ORAL at 12:37

## 2020-06-23 RX ADMIN — FAMOTIDINE 20 MG: 10 INJECTION, SOLUTION INTRAVENOUS at 01:21

## 2020-06-23 RX ADMIN — INSULIN LISPRO 2 UNITS: 100 INJECTION, SOLUTION INTRAVENOUS; SUBCUTANEOUS at 21:42

## 2020-06-23 RX ADMIN — INSULIN LISPRO 2 UNITS: 100 INJECTION, SOLUTION INTRAVENOUS; SUBCUTANEOUS at 12:41

## 2020-06-23 RX ADMIN — ACETAMINOPHEN 650 MG: 325 TABLET ORAL at 03:18

## 2020-06-23 RX ADMIN — ATORVASTATIN CALCIUM 10 MG: 10 TABLET, FILM COATED ORAL at 20:55

## 2020-06-23 RX ADMIN — LIDOCAINE HYDROCHLORIDE: 20 SOLUTION ORAL; TOPICAL at 02:36

## 2020-06-23 RX ADMIN — SODIUM CHLORIDE: 9 INJECTION, SOLUTION INTRAVENOUS at 04:39

## 2020-06-23 RX ADMIN — SODIUM CHLORIDE 1000 ML: 9 INJECTION, SOLUTION INTRAVENOUS at 02:35

## 2020-06-23 ASSESSMENT — PAIN DESCRIPTION - LOCATION
LOCATION: ABDOMEN
LOCATION: ABDOMEN

## 2020-06-23 ASSESSMENT — PAIN - FUNCTIONAL ASSESSMENT
PAIN_FUNCTIONAL_ASSESSMENT: ACTIVITIES ARE NOT PREVENTED
PAIN_FUNCTIONAL_ASSESSMENT: ACTIVITIES ARE NOT PREVENTED

## 2020-06-23 ASSESSMENT — PAIN DESCRIPTION - PROGRESSION
CLINICAL_PROGRESSION: NOT CHANGED
CLINICAL_PROGRESSION: NOT CHANGED

## 2020-06-23 ASSESSMENT — PAIN DESCRIPTION - FREQUENCY
FREQUENCY: CONTINUOUS
FREQUENCY: CONTINUOUS

## 2020-06-23 ASSESSMENT — PAIN DESCRIPTION - ORIENTATION
ORIENTATION: LEFT;MID
ORIENTATION: ANTERIOR;MID

## 2020-06-23 ASSESSMENT — PAIN DESCRIPTION - PAIN TYPE
TYPE: ACUTE PAIN
TYPE: ACUTE PAIN

## 2020-06-23 ASSESSMENT — PAIN DESCRIPTION - DESCRIPTORS
DESCRIPTORS: ACHING;CONSTANT;DISCOMFORT;PRESSURE
DESCRIPTORS: ACHING;CONSTANT;DISCOMFORT

## 2020-06-23 ASSESSMENT — PAIN SCALES - GENERAL
PAINLEVEL_OUTOF10: 6
PAINLEVEL_OUTOF10: 4
PAINLEVEL_OUTOF10: 0
PAINLEVEL_OUTOF10: 5

## 2020-06-23 ASSESSMENT — PAIN DESCRIPTION - ONSET
ONSET: ON-GOING
ONSET: ON-GOING

## 2020-06-23 NOTE — CARE COORDINATION
Patient is here under observation for Hyperglycemia due to type 2 diabetes mellitus. Per internal med note today, Diabetic educator to meet with patient. Depending on Hba1c may need insulin at home. Would consider changing to different hypoglycemic. Ozempic would be a good option due to once a week dosing. Limiting factor maybe cost. I made patient a meds to beds while here in the hospital. Cm/Sw following for any discharge needs.   Emi Foster RN CM

## 2020-06-23 NOTE — H&P
she has never smoked. She has never used smokeless tobacco.  ETOH:   reports no history of alcohol use. Family History:    Mom: Type II Diabetes Mellitus  Sister: Gestational diabetes Mellitus     REVIEW OF SYSTEMS:   Pertinent positives as noted in the HPI. All other systems reviewed and negative. PHYSICAL EXAM:    /81   Pulse 96   Temp 97.5 °F (36.4 °C) (Temporal)   Resp 18   Ht 5' 2\" (1.575 m)   Wt 200 lb (90.7 kg)   LMP 06/01/2020   SpO2 96%   BMI 36.58 kg/m²     General appearance:  No apparent distress, appears stated age and cooperative. HEENT:  Normal cephalic, atraumatic without obvious deformity. Pupils equal, round, and reactive to light. Extra ocular muscles intact. Conjunctivae/corneas clear. Neck: Supple, with full range of motion. No jugular venous distention. Trachea midline. Respiratory:  Normal respiratory effort. Clear to auscultation, bilaterally without Rales/Wheezes/Rhonchi. Cardiovascular:  Regular rate and rhythm with normal S1/S2 without murmurs, rubs or gallops. Abdomen: Soft, generalized tenderness without rebound or guarding , non-distended with normal bowel sounds. Musculoskeletal:  No clubbing, cyanosis or edema bilaterally. Full range of motion without deformity. Skin: Skin color, texture, turgor normal.  No rashes or lesions. Neurologic:  Neurovascularly intact without any focal sensory/motor deficits.  Cranial nerves: II-XII intact, grossly non-focal.  Psychiatric:  Alert and oriented, thought content appropriate, normal insight    CXR:  I have reviewed the CXR with the following interpretation: 6/20/20: No acute cardiopulmonary abnormality    EKG:  I have reviewed the EKG with the following interpretation: 6/20/20: Sinus tachycardia  Possible Left atrial enlargement    Labs:     Recent Labs     06/20/20  1605 06/22/20  2305   WBC 7.7 9.4   HGB 13.8 14.6   HCT 41.3 43.7    400     Recent Labs     06/20/20  1605 06/22/20  2305 06/23/20  0113   NA 134 135 136   K 4.7 4.0 3.7   CL 99 101 107   CO2 18* 12* 15*   BUN 8 3* 3*   CREATININE 0.6 0.5 0.5   CALCIUM 9.7 9.1 8.0*     Recent Labs     06/20/20  1605 06/22/20  2305   AST 7 11   ALT 5 <5   BILITOT 0.2 0.3   ALKPHOS 126* 89     No results for input(s): INR in the last 72 hours. Recent Labs     06/20/20  1605   TROPONINI <0.01       Urinalysis:      Lab Results   Component Value Date    NITRU Negative 06/22/2020    WBCUA 0-1 06/22/2020    BACTERIA MODERATE 06/22/2020    RBCUA 2-5 06/22/2020    BLOODU LARGE 06/22/2020    SPECGRAV >=1.030 06/22/2020    GLUCOSEU 250 06/22/2020         ASSESSMENT:    Active Hospital Problems    Diagnosis Date Noted    Hyperglycemia due to type 2 diabetes mellitus ([facility] [address] [E11.65] 06/23/2020   1. DKA  2. Uncontrolled Diabetes Mellitus Type II  3. Noncompliance  4. Hypothyroidism    PLAN:    Will hold metformin. Check Hba1c in am. Will use insulin sliding scale. Continue fluids. Check EKG in am. Diabetic educator to meet with patient. Depending on Hba1c may need insulin at home. Would consider changing to different hypoglycemic. Ozempic would be a good option due to once a week dosing. Limiting factor maybe cost. Explained the repercussions of uncontrolled sugar to patient including blindness, kidney failure, heart attacks and strokes. DVT Prophylaxis: Lovenox. Diet: DIET CARB CONTROL;  Code Status: Full Code    Dispo -Observation       Rena Ball DO    Thank you Marshall Sims DO for the opportunity to be involved in this patient's care. If you have any questions or concerns please feel free to contact me at [phone].

## 2020-06-23 NOTE — ED PROVIDER NOTES
ED Physician   HPI:  6/22/20, Time: 10:34 PM EDT         Kaylin Hernandez is a 23 y.o. female presenting to the ED for abdominal pain starting this morning. Patient reports that she woke up this morning experiencing mid epigastric pain. Patient reports that she checked her sugar this morning which was 340. Patient reports that she did not take her metformin as it causes her GI distress and was already experiencing abdominal pain. She reports that she has a history of GERD and used to take Pepcid and Carafate, but no longer takes these medications. Patient reports nausea but denies any vomiting or diarrhea. Patient reports that she has an increase in nausea when she attempts to eat or drink. Reports mid epigastric pain and has point tenderness on right upper quadrant. Denies any fever, chills, or chest pain that radiates. Patient denies any difficulty with urination she denies any back or flank pain associated with any of this. Review of Systems:   Pertinent positives and negatives are stated within HPI, all other systems reviewed and are negative.          --------------------------------------------- PAST HISTORY ---------------------------------------------  Past Medical History:  has a past medical history of Diabetes mellitus ([facility] [address] and Hypothyroid. Past Surgical History:  has no past surgical history on file. Social History:  reports that she has never smoked. She has never used smokeless tobacco. She reports that she does not drink alcohol or use drugs. Family History: family history is not on file. The patients home medications have been reviewed. Allergies: Patient has no known allergies.     -------------------------------------------------- RESULTS -------------------------------------------------  All laboratory and radiology results have been personally reviewed by myself   LABS:  Results for orders placed or performed during the hospital encounter of 06/22/20   CBC Auto Differential Negative Negative   Lipase   Result Value Ref Range    Lipase 16 13 - 60 U/L   pH, venous   Result Value Ref Range    pH, Jasvir 7.33 (L) 7.35 - 7.45   Beta-Hydroxybutyrate   Result Value Ref Range    Beta-Hydroxybutyrate >4.50 (H) 0.02 - 0.27 mmol/L   Pregnancy, urine   Result Value Ref Range    HCG(Urine) Pregnancy Test NEGATIVE NEGATIVE   Microscopic Urinalysis   Result Value Ref Range    WBC, UA 0-1 0 - 5 /HPF    RBC, UA 2-5 0 - 2 /HPF    Epithelial Cells, UA MODERATE /HPF    Bacteria, UA MODERATE (A) None Seen /HPF   Basic Metabolic Panel   Result Value Ref Range    Sodium 136 132 - 146 mmol/L    Potassium 3.7 3.5 - 5.0 mmol/L    Chloride 107 98 - 107 mmol/L    CO2 15 (L) 22 - 29 mmol/L    Anion Gap 14 7 - 16 mmol/L    Glucose 222 (H) 74 - 99 mg/dL    BUN 3 (L) 6 - 20 mg/dL    CREATININE 0.5 0.5 - 1.0 mg/dL    GFR Non-African American >60 >=60 mL/min/1.73    GFR African American >60     Calcium 8.0 (L) 8.6 - 10.2 mg/dL   POCT Glucose   Result Value Ref Range    Meter Glucose 282 (H) 74 - 99 mg/dL       RADIOLOGY:  Interpreted by Radiologist.  No orders to display       ------------------------- NURSING NOTES AND VITALS REVIEWED ---------------------------   The nursing notes within the ED encounter and vital signs as below have been reviewed. BP (!) 145/107   Pulse 110   Temp 97.5 °F (36.4 °C)   Resp 16   LMP 06/01/2020   SpO2 99%   Oxygen Saturation Interpretation: Normal      ---------------------------------------------------PHYSICAL EXAM--------------------------------------      Constitutional/General: Alert and oriented x3, well appearing, non toxic in NAD  Head: Normocephalic and atraumatic  Eyes: PERRL, EOMI  Mouth: Oropharynx clear, handling secretions, no trismus  Neck: Supple, full ROM,   Pulmonary: Lungs clear to auscultation bilaterally, no wheezes, rales, or rhonchi. Not in respiratory distress  Cardiovascular:  Regular rate and rhythm, no murmurs, gallops, or rubs.  2+ distal pulses  Abdomen: provide patient with IV  Regular insulin 5 units, another liter of normal saline and will obtain BMP once saline is completed. Will reevaluate the patient with BMP results as patient could possibly need admission this is her second Emergency Department concerning these issues. Patient's urine microscope shows moderate bacteria moderate epithelial cells. Will provide patient with 1 g IV Rocephin for UTI. Repeat BMP results show glucose 222, anion gap 14, CO2 15.,  Repeat base metabolic panel did show improvement but this is patient's second visit here, there was initial concerns for DKA which she did show improvement after IV insulin and IV fluids. Patient also with medical noncompliance, not taking her metformin as she should be. Patient will be admitted medically to observation unit. I did speak with Dr. Vicky Mccall who is agreeable to admit patient. Patient was made aware of plan for medical admission. Patient is resting much more comfortably at this time. Will provide patient with additional 1 L of fluids as well as GI cocktail. Patient resting comfortably. Patient nontoxic. Plan for admission. Counseling: The emergency provider has spoken with the patient and discussed todays results, in addition to providing specific details for the plan of care and counseling regarding the diagnosis and prognosis. Questions are answered at this time and they are agreeable with the plan.      --------------------------------- IMPRESSION AND DISPOSITION ---------------------------------    IMPRESSION  1. Epigastric pain    2. Nausea and vomiting, intractability of vomiting not specified, unspecified vomiting type    3. Dehydration    4. Diabetic ketoacidosis without coma associated with type 2 diabetes mellitus ([facility]        DISPOSITION  Disposition: Admit to med/surg floor  Patient condition is good      NOTE: This report was transcribed using voice recognition software.  Every effort was made to ensure accuracy; however, inadvertent computerized transcription errors may be present     ALEX Pearce CNP  06/23/20 7718  ATTENDING PROVIDER ATTESTATION:     I have personally performed and/or participated in the history, exam, medical decision making, and procedures and agree with all pertinent clinical information. I have also reviewed and agree with the past medical, family and social history unless otherwise noted. My findings/Plan: Patient presenting because of upper abdominal pain. Patient reporting some nausea but no vomiting. Patient reporting no diarrhea there is no black or tarry stools. Patient reporting no chest pain or difficulty breathing. Patient reporting no fever chills or cough. Patient has history of diabetes she does take oral hypoglycemic but has not been taking it due to GI upset. Patient reporting no headache. Patient is awake alert oriented heart lung exam normal patient is tender epigastric region patient neurologically intact labs noted and reviewed patient noted to be in DKA. Patient given IV fluids here a total of 2 L as well as IV insulin. Patient's BMP was rechecked her anion gap closed blood sugars noted and CO2 still low. Patient was given another liter of IV fluids. Patient abdominal pain improved with IV medications. Patient made aware of plan we did speak to on-call internal medicine patient will be admitted. Patient on recheck is nontoxic and in no acute distress.        Barrera Cabrera MD  06/23/20 [id number] [address], MD  06/23/20 7100

## 2020-06-24 LAB
METER GLUCOSE: 214 MG/DL (ref 74–99)
METER GLUCOSE: 243 MG/DL (ref 74–99)
METER GLUCOSE: 260 MG/DL (ref 74–99)
METER GLUCOSE: 291 MG/DL (ref 74–99)
URINE CULTURE, ROUTINE: NORMAL

## 2020-06-24 PROCEDURE — 96372 THER/PROPH/DIAG INJ SC/IM: CPT

## 2020-06-24 PROCEDURE — G0378 HOSPITAL OBSERVATION PER HR: HCPCS

## 2020-06-24 PROCEDURE — 2580000003 HC RX 258: Performed by: INTERNAL MEDICINE

## 2020-06-24 PROCEDURE — 82962 GLUCOSE BLOOD TEST: CPT

## 2020-06-24 PROCEDURE — 6370000000 HC RX 637 (ALT 250 FOR IP): Performed by: CLINICAL NURSE SPECIALIST

## 2020-06-24 PROCEDURE — 6370000000 HC RX 637 (ALT 250 FOR IP): Performed by: NURSE PRACTITIONER

## 2020-06-24 PROCEDURE — 6360000002 HC RX W HCPCS: Performed by: NURSE PRACTITIONER

## 2020-06-24 PROCEDURE — 6360000002 HC RX W HCPCS: Performed by: INTERNAL MEDICINE

## 2020-06-24 PROCEDURE — 6370000000 HC RX 637 (ALT 250 FOR IP): Performed by: INTERNAL MEDICINE

## 2020-06-24 RX ORDER — DICYCLOMINE HYDROCHLORIDE 10 MG/1
10 CAPSULE ORAL
Status: DISCONTINUED | OUTPATIENT
Start: 2020-06-24 | End: 2020-06-25 | Stop reason: HOSPADM

## 2020-06-24 RX ADMIN — ATORVASTATIN CALCIUM 10 MG: 10 TABLET, FILM COATED ORAL at 21:30

## 2020-06-24 RX ADMIN — ENOXAPARIN SODIUM 40 MG: 40 INJECTION SUBCUTANEOUS at 08:51

## 2020-06-24 RX ADMIN — LISINOPRIL 2.5 MG: 5 TABLET ORAL at 08:51

## 2020-06-24 RX ADMIN — ACETAMINOPHEN 650 MG: 325 TABLET ORAL at 11:49

## 2020-06-24 RX ADMIN — INSULIN LISPRO 6 UNITS: 100 INJECTION, SOLUTION INTRAVENOUS; SUBCUTANEOUS at 11:54

## 2020-06-24 RX ADMIN — SODIUM CHLORIDE: 9 INJECTION, SOLUTION INTRAVENOUS at 18:06

## 2020-06-24 RX ADMIN — INSULIN LISPRO 4 UNITS: 100 INJECTION, SOLUTION INTRAVENOUS; SUBCUTANEOUS at 16:30

## 2020-06-24 RX ADMIN — INSULIN LISPRO 3 UNITS: 100 INJECTION, SOLUTION INTRAVENOUS; SUBCUTANEOUS at 21:31

## 2020-06-24 RX ADMIN — DICYCLOMINE HYDROCHLORIDE 10 MG: 10 CAPSULE ORAL at 16:29

## 2020-06-24 RX ADMIN — ONDANSETRON 4 MG: 2 INJECTION INTRAMUSCULAR; INTRAVENOUS at 18:03

## 2020-06-24 RX ADMIN — INSULIN LISPRO 2 UNITS: 100 INJECTION, SOLUTION INTRAVENOUS; SUBCUTANEOUS at 08:52

## 2020-06-24 ASSESSMENT — PAIN SCALES - GENERAL
PAINLEVEL_OUTOF10: 0
PAINLEVEL_OUTOF10: 5
PAINLEVEL_OUTOF10: 0
PAINLEVEL_OUTOF10: 2

## 2020-06-24 NOTE — CARE COORDINATION
Patient remains here under observation for Hyperglycemia due to type 2 diabetes mellitus. Diabetic educator to meet with patient, consult order placed. Per internal med note yesterday, Depending on Hba1c may need insulin at home. Would consider changing to different hypoglycemic. Ozempic would be a good option due to once a week dosing. Limiting factor maybe cost. I made patient a meds to beds while here in the hospital. Cm/Sw following for any discharge needs.   Kalee Bhatt RN CM

## 2020-06-24 NOTE — PROGRESS NOTES
Hospitalist Progress Note      PCP: Cheo Mcrae DO    Date of Admission: 6/22/2020    Chief Complaint: abdominal pain, hyperglycemia    Hospital Course:   23 y.o. female who presented to [facility] due to waking up this am with abdominal pain and elevated blood glucose. Patient states that she is not consistent with taking her metformin as she has to eat with it or she will have abdominal discomfort. She is not good about eating with her medications. She states that the abdominal pain is located all over her stomach. She does admit to nausea with drinking anything and even taking a tylenol. She denies any diarrhea or vomiting with the abdominal pain. She states her last Hba1c was done in may but she does not remember the number. Denies any fever, chills, or chest pain that radiates.  Patient denies any difficulty with urination she denies any back or flank pain associated with any of this. 6/24/20: no acute events overnight. BGL improving. Insulin sliding scale increased to medium dose. Subjective:  Patient sitting up in bed, she is complaining of abdominal pain with nausea which she states is tolerable. She has no further complaints at the present time.      Medications:  Reviewed    Infusion Medications    sodium chloride 100 mL/hr at 06/23/20 2315    dextrose       Scheduled Medications    insulin lispro  0-12 Units Subcutaneous TID WC    insulin lispro  0-6 Units Subcutaneous Nightly    sodium chloride flush  10 mL Intravenous 2 times per day    enoxaparin  40 mg Subcutaneous Daily    atorvastatin  10 mg Oral Nightly    lisinopril  2.5 mg Oral Daily    norgestimate-ethinyl estradiol  1 tablet Oral Daily     PRN Meds: sodium chloride flush, acetaminophen, ondansetron, glucose, dextrose, glucagon (rDNA), dextrose    No intake or output data in the 24 hours ending 06/24/20 0947    Exam:    /79   Pulse 78   Temp 97.8 °F (36.6 °C) (Temporal)   Resp 16   Ht 5' 2\" (1.575 m)

## 2020-06-24 NOTE — CARE COORDINATION
Per our [address] [place] [address], Ozempic (0.25 or 0.5 MG/DOSE) 2MG/1.5ML pen-injectors requires prior auth . Prior auth request called to phone# [phone] who therefore directed me to request prior auth via online form through Cover [employer] Med[employer]. Request submitted. Await response.  Freddie English RN CM

## 2020-06-25 VITALS
WEIGHT: 200 LBS | SYSTOLIC BLOOD PRESSURE: 120 MMHG | BODY MASS INDEX: 36.8 KG/M2 | DIASTOLIC BLOOD PRESSURE: 85 MMHG | RESPIRATION RATE: 18 BRPM | OXYGEN SATURATION: 98 % | HEART RATE: 68 BPM | TEMPERATURE: 97.2 F | HEIGHT: 62 IN

## 2020-06-25 LAB — METER GLUCOSE: 223 MG/DL (ref 74–99)

## 2020-06-25 PROCEDURE — 96372 THER/PROPH/DIAG INJ SC/IM: CPT

## 2020-06-25 PROCEDURE — 6370000000 HC RX 637 (ALT 250 FOR IP): Performed by: INTERNAL MEDICINE

## 2020-06-25 PROCEDURE — G0378 HOSPITAL OBSERVATION PER HR: HCPCS

## 2020-06-25 PROCEDURE — 82962 GLUCOSE BLOOD TEST: CPT

## 2020-06-25 PROCEDURE — 6360000002 HC RX W HCPCS: Performed by: NURSE PRACTITIONER

## 2020-06-25 PROCEDURE — 6370000000 HC RX 637 (ALT 250 FOR IP): Performed by: CLINICAL NURSE SPECIALIST

## 2020-06-25 RX ORDER — GLIPIZIDE 5 MG/1
5 TABLET ORAL 2 TIMES DAILY
Qty: 60 TABLET | Refills: 3 | Status: SHIPPED | OUTPATIENT
Start: 2020-06-25 | End: 2022-03-31

## 2020-06-25 RX ADMIN — DICYCLOMINE HYDROCHLORIDE 10 MG: 10 CAPSULE ORAL at 06:16

## 2020-06-25 RX ADMIN — INSULIN LISPRO 4 UNITS: 100 INJECTION, SOLUTION INTRAVENOUS; SUBCUTANEOUS at 08:48

## 2020-06-25 RX ADMIN — NORGESTIMATE AND ETHINYL ESTRADIOL 1 TABLET: KIT ORAL at 09:00

## 2020-06-25 RX ADMIN — ENOXAPARIN SODIUM 40 MG: 40 INJECTION SUBCUTANEOUS at 08:45

## 2020-06-25 RX ADMIN — LISINOPRIL 2.5 MG: 5 TABLET ORAL at 11:18

## 2020-11-08 ENCOUNTER — HOSPITAL ENCOUNTER (EMERGENCY)
Age: 20
Discharge: HOME | End: 2020-11-08
Attending: EMERGENCY MEDICINE
Payer: MEDICAID

## 2020-11-08 ENCOUNTER — APPOINTMENT (OUTPATIENT)
Dept: GENERAL RADIOLOGY | Age: 20
End: 2020-11-08
Payer: MEDICAID

## 2020-11-08 VITALS
HEART RATE: 68 BPM | DIASTOLIC BLOOD PRESSURE: 74 MMHG | SYSTOLIC BLOOD PRESSURE: 123 MMHG | TEMPERATURE: 96.9 F | OXYGEN SATURATION: 100 % | RESPIRATION RATE: 16 BRPM

## 2020-11-08 LAB
ALBUMIN SERPL-MCNC: 4.1 G/DL (ref 3.5–5.2)
ALP BLD-CCNC: 82 U/L (ref 35–104)
ALT SERPL-CCNC: 8 U/L (ref 0–32)
ANION GAP SERPL CALCULATED.3IONS-SCNC: 11 MMOL/L (ref 7–16)
AST SERPL-CCNC: 13 U/L (ref 0–31)
BASOPHILS ABSOLUTE: 0.03 E9/L (ref 0–0.2)
BASOPHILS RELATIVE PERCENT: 0.6 % (ref 0–2)
BILIRUB SERPL-MCNC: 0.6 MG/DL (ref 0–1.2)
BUN BLDV-MCNC: 7 MG/DL (ref 6–20)
CALCIUM SERPL-MCNC: 9.1 MG/DL (ref 8.6–10.2)
CHLORIDE BLD-SCNC: 101 MMOL/L (ref 98–107)
CO2: 24 MMOL/L (ref 22–29)
CREAT SERPL-MCNC: 0.7 MG/DL (ref 0.5–1)
EOSINOPHILS ABSOLUTE: 0.05 E9/L (ref 0.05–0.5)
EOSINOPHILS RELATIVE PERCENT: 0.9 % (ref 0–6)
GFR AFRICAN AMERICAN: >60
GFR NON-AFRICAN AMERICAN: >60 ML/MIN/1.73
GLUCOSE BLD-MCNC: 88 MG/DL (ref 74–99)
HCG, URINE, POC: NEGATIVE
HCT VFR BLD CALC: 35.3 % (ref 34–48)
HEMOGLOBIN: 11.5 G/DL (ref 11.5–15.5)
IMMATURE GRANULOCYTES #: 0.02 E9/L
IMMATURE GRANULOCYTES %: 0.4 % (ref 0–5)
LYMPHOCYTES ABSOLUTE: 2.34 E9/L (ref 1.5–4)
LYMPHOCYTES RELATIVE PERCENT: 43 % (ref 20–42)
Lab: NORMAL
MCH RBC QN AUTO: 27 PG (ref 26–35)
MCHC RBC AUTO-ENTMCNC: 32.6 % (ref 32–34.5)
MCV RBC AUTO: 82.9 FL (ref 80–99.9)
MONOCYTES ABSOLUTE: 0.41 E9/L (ref 0.1–0.95)
MONOCYTES RELATIVE PERCENT: 7.5 % (ref 2–12)
NEGATIVE QC PASS/FAIL: NORMAL
NEUTROPHILS ABSOLUTE: 2.59 E9/L (ref 1.8–7.3)
NEUTROPHILS RELATIVE PERCENT: 47.6 % (ref 43–80)
PDW BLD-RTO: 12.8 FL (ref 11.5–15)
PLATELET # BLD: 371 E9/L (ref 130–450)
PMV BLD AUTO: 9.4 FL (ref 7–12)
POSITIVE QC PASS/FAIL: NORMAL
POTASSIUM REFLEX MAGNESIUM: 3.7 MMOL/L (ref 3.5–5)
RBC # BLD: 4.26 E12/L (ref 3.5–5.5)
SODIUM BLD-SCNC: 136 MMOL/L (ref 132–146)
TOTAL PROTEIN: 7.2 G/DL (ref 6.4–8.3)
TROPONIN: <0.01 NG/ML (ref 0–0.03)
WBC # BLD: 5.4 E9/L (ref 4.5–11.5)

## 2020-11-08 PROCEDURE — 93005 ELECTROCARDIOGRAM TRACING: CPT | Performed by: NURSE PRACTITIONER

## 2020-11-08 PROCEDURE — 71045 X-RAY EXAM CHEST 1 VIEW: CPT

## 2020-11-08 PROCEDURE — 99284 EMERGENCY DEPT VISIT MOD MDM: CPT

## 2020-11-08 PROCEDURE — 6360000002 HC RX W HCPCS: Performed by: NURSE PRACTITIONER

## 2020-11-08 PROCEDURE — 80053 COMPREHEN METABOLIC PANEL: CPT

## 2020-11-08 PROCEDURE — 85025 COMPLETE CBC W/AUTO DIFF WBC: CPT

## 2020-11-08 PROCEDURE — 96372 THER/PROPH/DIAG INJ SC/IM: CPT

## 2020-11-08 PROCEDURE — 84484 ASSAY OF TROPONIN QUANT: CPT

## 2020-11-08 RX ORDER — KETOROLAC TROMETHAMINE 30 MG/ML
30 INJECTION, SOLUTION INTRAMUSCULAR; INTRAVENOUS ONCE
Status: COMPLETED | OUTPATIENT
Start: 2020-11-08 | End: 2020-11-08

## 2020-11-08 RX ADMIN — KETOROLAC TROMETHAMINE 30 MG: 30 INJECTION, SOLUTION INTRAMUSCULAR at 14:28

## 2020-11-08 ASSESSMENT — PAIN SCALES - GENERAL
PAINLEVEL_OUTOF10: 3
PAINLEVEL_OUTOF10: 1

## 2020-11-08 NOTE — ED PROVIDER NOTES
ED Attending  CC: No         Department of Emergency Medicine   ED  Provider Note  Admit Date/RoomTime: 11/8/2020  1:29 PM  ED Room: 12/12  MRN: [id number]  Chief Complaint: Chest Pain (pt reports left sided pain radiating down her arm x 3 days. pt reports sob also. )       History of Present Illness   Source of history provided by:  patient. History/Exam Limitations: none. Harpreet Randall is a 23 y.o. female who presents to the ED by private car for chest pain and shortness of breath, beginning 3-4 days ago and are unchanged since that time. She states she has midsternal \" uncomfortable\" sensation that is nonradiating and always there despite position or time of day. She also has a sharp constant pain that goes from her clavicle over top of her left shoulder to her mid trapezius. She denies any associated traumatic incident and it is not aggravated by movement, taking a deep breath, range of motion of the shoulder or palpation of the area. There is been no associated fever, chills, syncope, back pain, nausea, vomiting, abdominal pain, diarrhea, dysuria, calf pain or leg swelling. She has no known exposure to Covid, flu or pneumonia. She does have some mild shortness of breath at rest without a cough and again there is no pleuritic chest pain. She has no history of blood clots and there is no family history of blood clots or early cardiac death. She was on out estrogen OCP but has been off it for the last 2 to 3 months. There has been no recent long distance travel, no surgery in the last 4 weeks and no hemoptysis. She is unsure if she has had a stress test at a young age otherwise she has no congenital abnormalities and her baseline diabetes has been under control recently with sugars under 200. She took some Pepcid over the last few days as she has a history of GERD and this does not seem to have changed her pain. Her last check of her thyroid was at the beginning of the year and has been stable. She admits to baseline anxiety and moderate stress. The complaint has been persistent, moderate in severity. PERC Rule for PE:      Age ? 50 negative     HR ? 100 negative     O2 Sat on Room Air < 95% negative     Prior History of DVT/PE negative     Recent Trauma or Surgery negative     Hemoptysis negative     Exogenous Estrogen/Hormone Use negative     Unilateral Extgremity Swelling negative     * If ANY Criteria are positive, the PERC rule is not satisfied and cannot be used to rule out PE in this patient. CAD Risk Factors:      Smoker:    No       FHx:    No       High Lipids:    No       HTN:    No       Diabetes:    Yes       Cocaine Use:    No       Lupus:    No       ROS    Pertinent positives and negatives are stated within HPI, all other systems reviewed and are negative. has a past medical history of Diabetes mellitus ([address] and Hypothyroid. has no past surgical history on file. Social History:  reports that she has never smoked. She has never used smokeless tobacco. She reports that she does not drink alcohol or use drugs. Family History: family history is not on file. Allergies: Patient has no known allergies. Physical Exam   Oxygen Saturation Interpretation: Normal.   ED Triage Vitals   BP Temp Temp src Pulse Resp SpO2 Height Weight   11/08/20 1238 11/08/20 1231 -- 11/08/20 1231 11/08/20 1238 11/08/20 1231 -- --   (!) 152/87 96.9 °F (36.1 °C)  82 17 97 %       Physical Exam  · Constitutional/General: Alert and oriented x3, well appearing, non toxic  · HEENT:  NC/NT. PERRLA,  Airway patent. · Neck: Supple, full ROM, non tender to palpation in the midline, no stridor, no crepitus, no meningeal signs  · Respiratory: Lungs clear to auscultation bilaterally, no wheezes, rales, or rhonchi. Not in respiratory distress  · CV:  Regular rate. Regular rhythm. No murmurs, gallops, or rubs.  2+ distal pulses  · Chest: No chest wall tenderness to palpation and no outward sign of trauma or vesicular rash.  · GI:  Abdomen Soft, Non tender, Non distended. +BS. No rebound, guarding, or rigidity. No pulsatile masses. · Musculoskeletal: Moves all extremities x 4. Warm and well perfused, no clubbing, cyanosis, or edema. No calf tenderness or palpable cords bilaterally. Capillary refill <3 seconds  · Integument: skin warm and dry. No rashes.    · Lymphatic: no lymphadenopathy noted  · Neurologic: GCS 15, no focal deficits, symmetric strength 5/5 in the upper and lower extremities bilaterally  · Psychiatric: Normal Affect    Lab / Imaging Results   (All laboratory and radiology results have been personally reviewed by myself)  Labs:  Results for orders placed or performed during the hospital encounter of 11/08/20   CBC Auto Differential   Result Value Ref Range    WBC 5.4 4.5 - 11.5 E9/L    RBC 4.26 3.50 - 5.50 E12/L    Hemoglobin 11.5 11.5 - 15.5 g/dL    Hematocrit 35.3 34.0 - 48.0 %    MCV 82.9 80.0 - 99.9 fL    MCH 27.0 26.0 - 35.0 pg    MCHC 32.6 32.0 - 34.5 %    RDW 12.8 11.5 - 15.0 fL    Platelets 898 191 - 053 E9/L    MPV 9.4 7.0 - 12.0 fL    Neutrophils % 47.6 43.0 - 80.0 %    Immature Granulocytes % 0.4 0.0 - 5.0 %    Lymphocytes % 43.0 (H) 20.0 - 42.0 %    Monocytes % 7.5 2.0 - 12.0 %    Eosinophils % 0.9 0.0 - 6.0 %    Basophils % 0.6 0.0 - 2.0 %    Neutrophils Absolute 2.59 1.80 - 7.30 E9/L    Immature Granulocytes # 0.02 E9/L    Lymphocytes Absolute 2.34 1.50 - 4.00 E9/L    Monocytes Absolute 0.41 0.10 - 0.95 E9/L    Eosinophils Absolute 0.05 0.05 - 0.50 E9/L    Basophils Absolute 0.03 0.00 - 0.20 E9/L   Comprehensive Metabolic Panel w/ Reflex to MG   Result Value Ref Range    Sodium 136 132 - 146 mmol/L    Potassium reflex Magnesium 3.7 3.5 - 5.0 mmol/L    Chloride 101 98 - 107 mmol/L    CO2 24 22 - 29 mmol/L    Anion Gap 11 7 - 16 mmol/L    Glucose 88 74 - 99 mg/dL    BUN 7 6 - 20 mg/dL    CREATININE 0.7 0.5 - 1.0 mg/dL    GFR Non-African American >60 >=60 mL/min/1.73    GFR African American >60 prognosis. Questions are answered at this time and are agreeable with the plan. Assessment      1. Chest pain, unspecified type      Plan   Discharge to home  Patient condition is stable    New Medications     New Prescriptions    No medications on file     Electronically signed by ALEX Love CNP   DD: 11/8/20  **This report was transcribed using voice recognition software. Every effort was made to ensure accuracy; however, inadvertent computerized transcription errors may be present.   END OF ED PROVIDER NOTE       ALEX Love CNP  11/08/20 2522

## 2020-11-08 NOTE — ED NOTES
Bed: 12  Expected date:   Expected time:   Means of arrival:   Comments:  Triage      Cecily Ornelas RN  11/08/20 3818

## 2020-11-10 LAB
EKG ATRIAL RATE: 87 BPM
EKG P AXIS: 57 DEGREES
EKG P-R INTERVAL: 154 MS
EKG Q-T INTERVAL: 370 MS
EKG QRS DURATION: 68 MS
EKG QTC CALCULATION (BAZETT): 445 MS
EKG R AXIS: 56 DEGREES
EKG T AXIS: 54 DEGREES
EKG VENTRICULAR RATE: 87 BPM

## 2020-11-10 PROCEDURE — 93010 ELECTROCARDIOGRAM REPORT: CPT | Performed by: INTERNAL MEDICINE

## 2021-08-21 ENCOUNTER — HOSPITAL ENCOUNTER (EMERGENCY)
Age: 21
Discharge: HOME | End: 2021-08-21
Attending: EMERGENCY MEDICINE
Payer: MEDICAID

## 2021-08-21 ENCOUNTER — APPOINTMENT (OUTPATIENT)
Dept: GENERAL RADIOLOGY | Age: 21
End: 2021-08-21
Payer: MEDICAID

## 2021-08-21 VITALS
SYSTOLIC BLOOD PRESSURE: 124 MMHG | DIASTOLIC BLOOD PRESSURE: 92 MMHG | OXYGEN SATURATION: 99 % | RESPIRATION RATE: 17 BRPM | HEIGHT: 61 IN | HEART RATE: 66 BPM | TEMPERATURE: 97.1 F | WEIGHT: 196 LBS | BODY MASS INDEX: 37 KG/M2

## 2021-08-21 DIAGNOSIS — R07.9 CHEST PAIN, UNSPECIFIED TYPE: Primary | ICD-10-CM

## 2021-08-21 LAB
ALBUMIN SERPL-MCNC: 4.2 G/DL (ref 3.5–5.2)
ALP BLD-CCNC: 89 U/L (ref 35–104)
ALT SERPL-CCNC: 17 U/L (ref 0–32)
ANION GAP SERPL CALCULATED.3IONS-SCNC: 10 MMOL/L (ref 7–16)
AST SERPL-CCNC: 14 U/L (ref 0–31)
BASOPHILS ABSOLUTE: 0.06 E9/L (ref 0–0.2)
BASOPHILS RELATIVE PERCENT: 1 % (ref 0–2)
BILIRUB SERPL-MCNC: 0.3 MG/DL (ref 0–1.2)
BUN BLDV-MCNC: 7 MG/DL (ref 6–20)
CALCIUM SERPL-MCNC: 9.7 MG/DL (ref 8.6–10.2)
CHLORIDE BLD-SCNC: 105 MMOL/L (ref 98–107)
CO2: 25 MMOL/L (ref 22–29)
CREAT SERPL-MCNC: 0.6 MG/DL (ref 0.5–1)
EKG ATRIAL RATE: 80 BPM
EKG P AXIS: 73 DEGREES
EKG P-R INTERVAL: 138 MS
EKG Q-T INTERVAL: 368 MS
EKG QRS DURATION: 68 MS
EKG QTC CALCULATION (BAZETT): 424 MS
EKG R AXIS: 70 DEGREES
EKG T AXIS: 66 DEGREES
EKG VENTRICULAR RATE: 80 BPM
EOSINOPHILS ABSOLUTE: 0.04 E9/L (ref 0.05–0.5)
EOSINOPHILS RELATIVE PERCENT: 0.7 % (ref 0–6)
GFR AFRICAN AMERICAN: >60
GFR NON-AFRICAN AMERICAN: >60 ML/MIN/1.73
GLUCOSE BLD-MCNC: 89 MG/DL (ref 74–99)
HCG, URINE, POC: NEGATIVE
HCT VFR BLD CALC: 37.4 % (ref 34–48)
HEMOGLOBIN: 12.6 G/DL (ref 11.5–15.5)
IMMATURE GRANULOCYTES #: 0.03 E9/L
IMMATURE GRANULOCYTES %: 0.5 % (ref 0–5)
LYMPHOCYTES ABSOLUTE: 2.21 E9/L (ref 1.5–4)
LYMPHOCYTES RELATIVE PERCENT: 36.6 % (ref 20–42)
Lab: NORMAL
MCH RBC QN AUTO: 27.3 PG (ref 26–35)
MCHC RBC AUTO-ENTMCNC: 33.7 % (ref 32–34.5)
MCV RBC AUTO: 81.1 FL (ref 80–99.9)
MONOCYTES ABSOLUTE: 0.43 E9/L (ref 0.1–0.95)
MONOCYTES RELATIVE PERCENT: 7.1 % (ref 2–12)
NEGATIVE QC PASS/FAIL: NORMAL
NEUTROPHILS ABSOLUTE: 3.27 E9/L (ref 1.8–7.3)
NEUTROPHILS RELATIVE PERCENT: 54.1 % (ref 43–80)
PDW BLD-RTO: 13.1 FL (ref 11.5–15)
PLATELET # BLD: 342 E9/L (ref 130–450)
PMV BLD AUTO: 9.9 FL (ref 7–12)
POSITIVE QC PASS/FAIL: NORMAL
POTASSIUM SERPL-SCNC: 3.7 MMOL/L (ref 3.5–5)
RBC # BLD: 4.61 E12/L (ref 3.5–5.5)
SODIUM BLD-SCNC: 140 MMOL/L (ref 132–146)
TOTAL PROTEIN: 7.3 G/DL (ref 6.4–8.3)
TROPONIN, HIGH SENSITIVITY: <6 NG/L (ref 0–9)
WBC # BLD: 6 E9/L (ref 4.5–11.5)

## 2021-08-21 PROCEDURE — 93010 ELECTROCARDIOGRAM REPORT: CPT | Performed by: INTERNAL MEDICINE

## 2021-08-21 PROCEDURE — 93005 ELECTROCARDIOGRAM TRACING: CPT | Performed by: EMERGENCY MEDICINE

## 2021-08-21 PROCEDURE — 96374 THER/PROPH/DIAG INJ IV PUSH: CPT

## 2021-08-21 PROCEDURE — 85025 COMPLETE CBC W/AUTO DIFF WBC: CPT

## 2021-08-21 PROCEDURE — 36415 COLL VENOUS BLD VENIPUNCTURE: CPT

## 2021-08-21 PROCEDURE — 99285 EMERGENCY DEPT VISIT HI MDM: CPT

## 2021-08-21 PROCEDURE — 80053 COMPREHEN METABOLIC PANEL: CPT

## 2021-08-21 PROCEDURE — 6360000002 HC RX W HCPCS: Performed by: EMERGENCY MEDICINE

## 2021-08-21 PROCEDURE — 84484 ASSAY OF TROPONIN QUANT: CPT

## 2021-08-21 PROCEDURE — 71045 X-RAY EXAM CHEST 1 VIEW: CPT

## 2021-08-21 RX ORDER — KETOROLAC TROMETHAMINE 30 MG/ML
15 INJECTION, SOLUTION INTRAMUSCULAR; INTRAVENOUS ONCE
Status: COMPLETED | OUTPATIENT
Start: 2021-08-21 | End: 2021-08-21

## 2021-08-21 RX ADMIN — KETOROLAC TROMETHAMINE 15 MG: 30 INJECTION, SOLUTION INTRAMUSCULAR; INTRAVENOUS at 14:42

## 2021-08-21 ASSESSMENT — PAIN SCALES - GENERAL: PAINLEVEL_OUTOF10: 1

## 2021-08-21 NOTE — ED PROVIDER NOTES
Department of Emergency Medicine   ED  Provider Note  Admit Date/RoomTime: 8/21/2021 12:48 PM  ED Room: 13/13          History of Present Illness:  8/21/21, Time: 2:42 PM EDT  Chief Complaint   Patient presents with    Chest Pain     for a couple of days with pinching feeling in left side of chest, left arm heaviness                Kandace Jones is a 24 y.o. female presenting to the ED for chest pain. Patient's been having a pinching sensation over the left side of her chest for the past couple of days. Came on gradually, intermittent nature, worse when she presses on the area and moves, improves with rest.  She has not had this before. Denies any PE risk factors. Symptoms began after she was moving furniture. Denies any direct injury or trauma. Denies any fever, chills, nausea, vomiting, change in bowel or bladder, lethargy, paresthesias, shortness breath, cough, sputum, or any other symptoms or complaints. Review of Systems:   Pertinent positives and negatives are stated within HPI, all other systems reviewed and are negative.        --------------------------------------------- PAST HISTORY ---------------------------------------------  Past Medical History:  has a past medical history of Diabetes mellitus ([facility] [address] and Hypothyroid. Past Surgical History:  has no past surgical history on file. Social History:  reports that she has never smoked. She has never used smokeless tobacco. She reports that she does not drink alcohol and does not use drugs. Family History: family history is not on file. . Unless otherwise noted, family history is non contributory    The patients home medications have been reviewed. Allergies: Patient has no known allergies.         ---------------------------------------------------PHYSICAL EXAM--------------------------------------    Constitutional/General: Alert and oriented x3  Head: Normocephalic and atraumatic  Eyes: PERRL, EOMI, sclera non icteric  Mouth: Oropharynx clear, handling secretions, no trismus, no asymmetry of the posterior oropharynx or uvular edema  Neck: Supple, full ROM, no stridor, no meningeal signs  Respiratory: Lungs clear to auscultation bilaterally, no wheezes, rales, or rhonchi. Not in respiratory distress  Cardiovascular:  Regular rate. Regular rhythm. 2+ distal pulses. Equal extremity pulses. Chest: Tenderness palpation over the left parasternal area, no crepitus  GI:  Abdomen Soft, Non tender, Non distended. No rebound, guarding, or rigidity. No pulsatile masses. Musculoskeletal: Moves all extremities x 4. Warm and well perfused, no clubbing, cyanosis, or edema. Capillary refill <3 seconds  Integument: skin warm and dry. No rashes. Neurologic: GCS 15, no focal deficits, symmetric strength 5/5 in the upper and lower extremities bilaterally. NIH is 0  Psychiatric: Normal Affect          -------------------------------------------------- RESULTS -------------------------------------------------  I have personally reviewed all laboratory and imaging results for this patient. Results are listed below.      LABS: (Lab results interpreted by me)  Results for orders placed or performed during the hospital encounter of 08/21/21   CBC Auto Differential   Result Value Ref Range    WBC 6.0 4.5 - 11.5 E9/L    RBC 4.61 3.50 - 5.50 E12/L    Hemoglobin 12.6 11.5 - 15.5 g/dL    Hematocrit 37.4 34.0 - 48.0 %    MCV 81.1 80.0 - 99.9 fL    MCH 27.3 26.0 - 35.0 pg    MCHC 33.7 32.0 - 34.5 %    RDW 13.1 11.5 - 15.0 fL    Platelets 746 458 - 249 E9/L    MPV 9.9 7.0 - 12.0 fL    Neutrophils % 54.1 43.0 - 80.0 %    Immature Granulocytes % 0.5 0.0 - 5.0 %    Lymphocytes % 36.6 20.0 - 42.0 %    Monocytes % 7.1 2.0 - 12.0 %    Eosinophils % 0.7 0.0 - 6.0 %    Basophils % 1.0 0.0 - 2.0 %    Neutrophils Absolute 3.27 1.80 - 7.30 E9/L    Immature Granulocytes # 0.03 E9/L    Lymphocytes Absolute 2.21 1.50 - 4.00 E9/L    Monocytes Absolute 0.43 0.10 - 0.95 E9/L ------------------------------ ED COURSE/MEDICAL DECISION MAKING----------------------  Medications   ketorolac (TORADOL) injection 15 mg (has no administration in time range)           The cardiac monitor revealed sinus with a heart rate in the 80s as interpreted by me. The cardiac monitor was ordered secondary to the patient's chest pain and to monitor the patient for dysrhythmia. CPT 89440         Medical Decision Making:    Labs and imaging reviewed. Patient's PERC score 0. She has reproducible pain intermittently for 3 days with a normal EKG and a negative high sensitive troponin. Did improve with Toradol. Reevaluation, no symptoms or complaints. Given this, and her history, did not feel that further emergent evaluation was indicated. Patient will be discharged, she is to follow-up with her PCP, she is educated on signs and symptoms that require emergent evaluation. Counseling: The emergency provider has spoken with the patient and discussed todays results, in addition to providing specific details for the plan of care and counseling regarding the diagnosis and prognosis. Questions are answered at this time and they are agreeable with the plan.       --------------------------------- IMPRESSION AND DISPOSITION ---------------------------------    IMPRESSION  1. Chest pain, unspecified type        DISPOSITION  Disposition: Discharge to home  Patient condition is stable        NOTE: This report was transcribed using voice recognition software.  Every effort was made to ensure accuracy; however, inadvertent computerized transcription errors may be present        Leonel Montoya MD  08/22/21 [id number]

## 2021-10-12 LAB
BASOPHILS ABSOLUTE: 0.08 E9/L (ref 0–0.2)
BASOPHILS RELATIVE PERCENT: 0.9 % (ref 0–2)
EOSINOPHILS ABSOLUTE: 0.34 E9/L (ref 0.05–0.5)
EOSINOPHILS RELATIVE PERCENT: 3.9 % (ref 0–6)
HCT VFR BLD CALC: 37.4 % (ref 34–48)
HEMOGLOBIN: 12.6 G/DL (ref 11.5–15.5)
IMMATURE GRANULOCYTES #: 0.02 E9/L
IMMATURE GRANULOCYTES %: 0.2 % (ref 0–5)
LYMPHOCYTES ABSOLUTE: 3.49 E9/L (ref 1.5–4)
LYMPHOCYTES RELATIVE PERCENT: 40.2 % (ref 20–42)
MCH RBC QN AUTO: 27 PG (ref 26–35)
MCHC RBC AUTO-ENTMCNC: 33.7 % (ref 32–34.5)
MCV RBC AUTO: 80.1 FL (ref 80–99.9)
MONOCYTES ABSOLUTE: 0.52 E9/L (ref 0.1–0.95)
MONOCYTES RELATIVE PERCENT: 6 % (ref 2–12)
NEUTROPHILS ABSOLUTE: 4.23 E9/L (ref 1.8–7.3)
NEUTROPHILS RELATIVE PERCENT: 48.8 % (ref 43–80)
PDW BLD-RTO: 12.8 FL (ref 11.5–15)
PLATELET # BLD: 402 E9/L (ref 130–450)
PMV BLD AUTO: 10.1 FL (ref 7–12)
RBC # BLD: 4.67 E12/L (ref 3.5–5.5)
WBC # BLD: 8.7 E9/L (ref 4.5–11.5)

## 2021-10-12 PROCEDURE — 84443 ASSAY THYROID STIM HORMONE: CPT

## 2021-10-12 PROCEDURE — 84484 ASSAY OF TROPONIN QUANT: CPT

## 2021-10-12 PROCEDURE — 80053 COMPREHEN METABOLIC PANEL: CPT

## 2021-10-12 PROCEDURE — 83735 ASSAY OF MAGNESIUM: CPT

## 2021-10-12 PROCEDURE — 93005 ELECTROCARDIOGRAM TRACING: CPT | Performed by: NURSE PRACTITIONER

## 2021-10-12 PROCEDURE — 85025 COMPLETE CBC W/AUTO DIFF WBC: CPT

## 2021-10-12 PROCEDURE — 99284 EMERGENCY DEPT VISIT MOD MDM: CPT

## 2021-10-12 NOTE — ED NOTES
FIRST PROVIDER CONTACT ASSESSMENT NOTE                                                                                                Department of Emergency Medicine                                                      First Provider Note  10/12/21  7:50 PM EDT  NAME: Inna Goncalves  : 2000  MRN: [id number]    Chief Complaint: No chief complaint on file. History of Present Illness:   Inna Goncalves is a 24 y.o. female who presents to the ED for palpitations. Patient presents emergency department with having frequent intermittent periods of palpitations. Patient reports that she did get her Covid vaccine Thursday. States that she did have an anxiety attack on  and then also began to have episodes of palpitations she is not sure if it is related to that being coincidental or that is related to the vaccine. It was #2 Moderna vaccine. Focused Physical Exam:  VS:    ED Triage Vitals [10/12/21 1917]   BP Temp Temp src Pulse Resp SpO2 Height Weight   -- -- -- 96 -- 96 % -- --        General: Alert and in no apparent distress. Medical History:  has a past medical history of Diabetes mellitus ([address] and Hypothyroid. Surgical History:  has no past surgical history on file. Social History:  reports that she has never smoked. She has never used smokeless tobacco. She reports that she does not drink alcohol and does not use drugs. Family History: family history is not on file. Allergies: Patient has no known allergies.      Initial Plan of Care:  Initiate Treatment-Testing, Proceed toTreatment Area When Bed Available for ED Attending/MLP to Continue Care    -------------------------------------------------END OF FIRST PROVIDER CONTACT ASSESSMENT NOTE--------------------------------------------------------  Electronically signed by ALEX Whitfield CNP   DD: 10/12/21       ALEX Whitfield CNP  10/12/21 1951

## 2021-10-13 ENCOUNTER — APPOINTMENT (OUTPATIENT)
Dept: GENERAL RADIOLOGY | Age: 21
End: 2021-10-13
Payer: MEDICAID

## 2021-10-13 ENCOUNTER — HOSPITAL ENCOUNTER (EMERGENCY)
Age: 21
Discharge: HOME | End: 2021-10-13
Attending: EMERGENCY MEDICINE
Payer: MEDICAID

## 2021-10-13 VITALS
HEIGHT: 62 IN | WEIGHT: 190 LBS | DIASTOLIC BLOOD PRESSURE: 95 MMHG | HEART RATE: 73 BPM | OXYGEN SATURATION: 97 % | TEMPERATURE: 99 F | BODY MASS INDEX: 34.96 KG/M2 | RESPIRATION RATE: 16 BRPM | SYSTOLIC BLOOD PRESSURE: 126 MMHG

## 2021-10-13 DIAGNOSIS — R00.2 PALPITATIONS: Primary | ICD-10-CM

## 2021-10-13 LAB
ALBUMIN SERPL-MCNC: 4.5 G/DL (ref 3.5–5.2)
ALP BLD-CCNC: 111 U/L (ref 35–104)
ALT SERPL-CCNC: 12 U/L (ref 0–32)
ANION GAP SERPL CALCULATED.3IONS-SCNC: 22 MMOL/L (ref 7–16)
AST SERPL-CCNC: 14 U/L (ref 0–31)
BILIRUB SERPL-MCNC: <0.2 MG/DL (ref 0–1.2)
BUN BLDV-MCNC: 7 MG/DL (ref 6–20)
CALCIUM SERPL-MCNC: 10 MG/DL (ref 8.6–10.2)
CHLORIDE BLD-SCNC: 105 MMOL/L (ref 98–107)
CO2: 15 MMOL/L (ref 22–29)
CREAT SERPL-MCNC: 0.7 MG/DL (ref 0.5–1)
D DIMER: <200 NG/ML DDU
EKG ATRIAL RATE: 76 BPM
EKG P AXIS: 56 DEGREES
EKG P-R INTERVAL: 144 MS
EKG Q-T INTERVAL: 358 MS
EKG QRS DURATION: 72 MS
EKG QTC CALCULATION (BAZETT): 402 MS
EKG R AXIS: 49 DEGREES
EKG T AXIS: 42 DEGREES
EKG VENTRICULAR RATE: 76 BPM
GFR AFRICAN AMERICAN: >60
GFR NON-AFRICAN AMERICAN: >60 ML/MIN/1.73
GLUCOSE BLD-MCNC: 129 MG/DL (ref 74–99)
HCG, URINE, POC: NEGATIVE
Lab: NORMAL
MAGNESIUM: 1.9 MG/DL (ref 1.6–2.6)
NEGATIVE QC PASS/FAIL: NORMAL
POSITIVE QC PASS/FAIL: NORMAL
POTASSIUM SERPL-SCNC: 4 MMOL/L (ref 3.5–5)
SODIUM BLD-SCNC: 142 MMOL/L (ref 132–146)
TOTAL PROTEIN: 7.2 G/DL (ref 6.4–8.3)
TROPONIN, HIGH SENSITIVITY: <6 NG/L (ref 0–9)
TSH SERPL DL<=0.05 MIU/L-ACNC: 3.5 UIU/ML (ref 0.27–4.2)

## 2021-10-13 PROCEDURE — 71046 X-RAY EXAM CHEST 2 VIEWS: CPT

## 2021-10-13 PROCEDURE — 85378 FIBRIN DEGRADE SEMIQUANT: CPT

## 2021-10-13 PROCEDURE — 93010 ELECTROCARDIOGRAM REPORT: CPT | Performed by: INTERNAL MEDICINE

## 2021-10-13 NOTE — ED PROVIDER NOTES
Department of Emergency Medicine   ED  Provider Note  Admit Date/RoomTime: 10/13/2021  1:34 PM  ED Room: [place] [address]          History of Present Illness:  10/13/21, Time: 1:42 PM EDT  Chief Complaint   Patient presents with    Palpitations     had 2nd covid vaccine last thursday,noticed palpitations and sob since then, also has hx of anxiety so thought it was that at first                 Nahomy Sharpe is a 24 y.o. female presenting to the ED for palpitations. Patient states she is been having palpitations for the past 3 days. Came on gradually, nothing makes it better or worse, does not radiate anywhere, no associated pain. States his symptoms began after her second Covid vaccine. Denies any fevers or chills. Denies any fever, chills, cough, sputum, change in bowel or bladder, neck pain or stiffness, lethargy, paresthesias, or any other symptoms or complaints. Review of Systems:   Pertinent positives and negatives are stated within HPI, all other systems reviewed and are negative.        --------------------------------------------- PAST HISTORY ---------------------------------------------  Past Medical History:  has a past medical history of Diabetes mellitus ([facility] [address] and Hypothyroid. Past Surgical History:  has no past surgical history on file. Social History:  reports that she has never smoked. She has never used smokeless tobacco. She reports that she does not drink alcohol and does not use drugs. Family History: family history is not on file. . Unless otherwise noted, family history is non contributory    The patients home medications have been reviewed. Allergies: Patient has no known allergies.         ---------------------------------------------------PHYSICAL EXAM--------------------------------------    Constitutional/General: Alert and oriented x3  Head: Normocephalic and atraumatic  Eyes: PERRL, EOMI, sclera non icteric  Mouth: Oropharynx clear, handling secretions, no trismus, no asymmetry of the posterior oropharynx or uvular edema  Neck: Supple, full ROM, no stridor, no meningeal signs  Respiratory: Lungs clear to auscultation bilaterally, no wheezes, rales, or rhonchi. Not in respiratory distress  Cardiovascular:  Regular rate. Regular rhythm. 2+ distal pulses. Equal extremity pulses. Chest: No chest wall tenderness  GI:  Abdomen Soft, Non tender, Non distended. No rebound, guarding, or rigidity. No pulsatile masses. Musculoskeletal: Moves all extremities x 4. Warm and well perfused, no clubbing, cyanosis, or edema. Capillary refill <3 seconds  Integument: skin warm and dry. No rashes. Neurologic: GCS 15, no focal deficits, symmetric strength 5/5 in the upper and lower extremities bilaterally  Psychiatric: Normal Affect          -------------------------------------------------- RESULTS -------------------------------------------------  I have personally reviewed all laboratory and imaging results for this patient. Results are listed below.      LABS: (Lab results interpreted by me)  Results for orders placed or performed during the hospital encounter of 10/13/21   Troponin   Result Value Ref Range    Troponin, High Sensitivity <6 0 - 9 ng/L   CBC Auto Differential   Result Value Ref Range    WBC 8.7 4.5 - 11.5 E9/L    RBC 4.67 3.50 - 5.50 E12/L    Hemoglobin 12.6 11.5 - 15.5 g/dL    Hematocrit 37.4 34.0 - 48.0 %    MCV 80.1 80.0 - 99.9 fL    MCH 27.0 26.0 - 35.0 pg    MCHC 33.7 32.0 - 34.5 %    RDW 12.8 11.5 - 15.0 fL    Platelets 930 938 - 509 E9/L    MPV 10.1 7.0 - 12.0 fL    Neutrophils % 48.8 43.0 - 80.0 %    Immature Granulocytes % 0.2 0.0 - 5.0 %    Lymphocytes % 40.2 20.0 - 42.0 %    Monocytes % 6.0 2.0 - 12.0 %    Eosinophils % 3.9 0.0 - 6.0 %    Basophils % 0.9 0.0 - 2.0 %    Neutrophils Absolute 4.23 1.80 - 7.30 E9/L    Immature Granulocytes # 0.02 E9/L    Lymphocytes Absolute 3.49 1.50 - 4.00 E9/L    Monocytes Absolute 0.52 0.10 - 0.95 E9/L    Eosinophils Absolute 0.34 0.05 - 0.50 E9/L    Basophils Absolute 0.08 0.00 - 0.20 E9/L   Comprehensive Metabolic Panel   Result Value Ref Range    Sodium 142 132 - 146 mmol/L    Potassium 4.0 3.5 - 5.0 mmol/L    Chloride 105 98 - 107 mmol/L    CO2 15 (L) 22 - 29 mmol/L    Anion Gap 22 (H) 7 - 16 mmol/L    Glucose 129 (H) 74 - 99 mg/dL    BUN 7 6 - 20 mg/dL    CREATININE 0.7 0.5 - 1.0 mg/dL    GFR Non-African American >60 >=60 mL/min/1.73    GFR African American >60     Calcium 10.0 8.6 - 10.2 mg/dL    Total Protein 7.2 6.4 - 8.3 g/dL    Albumin 4.5 3.5 - 5.2 g/dL    Total Bilirubin <0.2 0.0 - 1.2 mg/dL    Alkaline Phosphatase 111 (H) 35 - 104 U/L    ALT 12 0 - 32 U/L    AST 14 0 - 31 U/L   Magnesium   Result Value Ref Range    Magnesium 1.9 1.6 - 2.6 mg/dL   TSH without Reflex   Result Value Ref Range    TSH 3.500 0.270 - 4.200 uIU/mL   D-Dimer, Quantitative   Result Value Ref Range    D-Dimer, Quant <200 ng/mL DDU   EKG 12 Lead   Result Value Ref Range    Ventricular Rate 76 BPM    Atrial Rate 76 BPM    P-R Interval 144 ms    QRS Duration 72 ms    Q-T Interval 358 ms    QTc Calculation (Bazett) 402 ms    P Axis 56 degrees    R Axis 49 degrees    T Axis 42 degrees   ,       RADIOLOGY:  Interpreted by Radiologist unless otherwise specified  XR CHEST (2 VW)    (Results Pending)         EKG Interpretation  Interpreted by emergency department physician, Dr. Tomas Cerna     Sinus, rate 76, no STEMI, no ischemic change        ------------------------- NURSING NOTES AND VITALS REVIEWED ---------------------------   The nursing notes within the ED encounter and vital signs as below have been reviewed by myself  BP (!) 168/102   Pulse 92   Temp 99 °F (37.2 °C)   Resp 17   Ht 5' 1.5\" (1.562 m)   Wt 190 lb (86.2 kg)   LMP 09/27/2021   SpO2 100%   BMI 35.32 kg/m²     Oxygen Saturation Interpretation: Normal    The patients available past medical records and past encounters were reviewed.         ------------------------------ ED COURSE/MEDICAL DECISION MAKING----------------------  Medications - No data to display        The cardiac monitor revealed sinus with a heart rate in the 80s as interpreted by me. The cardiac monitor was ordered secondary to the patient's chest pain and to monitor the patient for dysrhythmia. CPT 19549         Medical Decision Making:    Labs and imaging reviewed. Heart score is 1. D-dimer negative. Patient been hemodynamically stable in the department. Given this, do not feel a further emergent evaluation was indicated. Patient will be discharged, she is to follow-up, she is educated on signs and symptoms that require emergent evaluation. Counseling: The emergency provider has spoken with the patient and discussed todays results, in addition to providing specific details for the plan of care and counseling regarding the diagnosis and prognosis. Questions are answered at this time and they are agreeable with the plan.       --------------------------------- IMPRESSION AND DISPOSITION ---------------------------------    IMPRESSION  1. Palpitations        DISPOSITION  Disposition: Discharge to home  Patient condition is stable        NOTE: This report was transcribed using voice recognition software.  Every effort was made to ensure accuracy; however, inadvertent computerized transcription errors may be present        Juan Manuel Caldwell MD  10/13/21 0524

## 2021-12-16 ENCOUNTER — OFFICE VISIT (OUTPATIENT)
Dept: PRIMARY CARE CLINIC | Age: 21
End: 2021-12-16
Payer: MEDICAID

## 2021-12-16 VITALS
DIASTOLIC BLOOD PRESSURE: 84 MMHG | HEART RATE: 81 BPM | SYSTOLIC BLOOD PRESSURE: 120 MMHG | BODY MASS INDEX: 34.36 KG/M2 | TEMPERATURE: 98.1 F | HEIGHT: 61 IN | WEIGHT: 182 LBS | OXYGEN SATURATION: 98 %

## 2021-12-16 DIAGNOSIS — B34.9 VIRAL ILLNESS: Primary | ICD-10-CM

## 2021-12-16 LAB
Lab: NORMAL
PERFORMING INSTRUMENT: NORMAL
QC PASS/FAIL: NORMAL
SARS-COV-2, POC: NORMAL

## 2021-12-16 PROCEDURE — 1036F TOBACCO NON-USER: CPT | Performed by: NURSE PRACTITIONER

## 2021-12-16 PROCEDURE — G8427 DOCREV CUR MEDS BY ELIG CLIN: HCPCS | Performed by: NURSE PRACTITIONER

## 2021-12-16 PROCEDURE — 87426 SARSCOV CORONAVIRUS AG IA: CPT | Performed by: NURSE PRACTITIONER

## 2021-12-16 PROCEDURE — G8484 FLU IMMUNIZE NO ADMIN: HCPCS | Performed by: NURSE PRACTITIONER

## 2021-12-16 PROCEDURE — 99213 OFFICE O/P EST LOW 20 MIN: CPT | Performed by: NURSE PRACTITIONER

## 2021-12-16 PROCEDURE — G8417 CALC BMI ABV UP PARAM F/U: HCPCS | Performed by: NURSE PRACTITIONER

## 2021-12-16 NOTE — PROGRESS NOTES
Chief Complaint   Generalized Body Aches (x 3- 4 days), Fatigue, and Cough (mild)    History of Present Illness   Source of history provided by:  patient. Viraj Kebede is a 24 y.o. old female who presents to walk-in with complaints of Generalized Body Aches, dry Cough and Fatigue x 4 days. States symptoms have been unchanged since onset. Has been taking Amoxicillin with symptomatic relief. Currently denies any Fever, Shortness of breath, Nausea, Vomiting, Chest Pain, Abdominal Pain, Rash or Close contact with a lab confirmed COVID-19 patient within 14 days of symptom onset . Denies any hx of asthma. Denies tobacco use. Patient denies history of COVID-19. Patient has received a COVID-19 vaccination. Recently put on amoxicillin for suspected strep pharyngitis, on day 5 of abx. ROS   Pertinent positives and negatives are stated within HPI, all other systems reviewed and are negative. Past Medical History:  has a past medical history of Diabetes mellitus ([address] and Hypothyroid. Past Surgical History:  has no past surgical history on file. Social History:  reports that she has never smoked. She has never used smokeless tobacco. She reports that she does not drink alcohol and does not use drugs. Family History: family history is not on file. Allergies: Patient has no known allergies. Physical Exam   Vital Signs:  /84   Pulse 81   Temp 98.1 °F (36.7 °C)   Ht 5' 1\" (1.549 m)   Wt 182 lb (82.6 kg)   SpO2 98%   BMI 34.39 kg/m²    Oxygen Saturation Interpretation: Normal.    Constitutional:  Alert, development consistent with age. NAD. Head:  NC/NT. Airway patent. Ears: TMs clear bilaterally. Canals without exudate or swelling bilaterally. Mouth: Posterior pharynx with mild erythema and clear postnasal drip. There is 1+ tonsillar hypertrophy or exudate. Neck:  Normal ROM. Supple. There is no anterior cervical adenopathy noted. Lungs: CTAB without wheezes, rales, or rhonchi.    CV: Regular rate and rhythm, normal heart sounds, without pathological murmurs, ectopy, gallops, or rubs. Skin:  Normal turgor. Warm, dry, without visible rash. Lymphatic: No lymphangitis or adenopathy noted. Neurological:  Oriented. Motor functions intact. Lab / Imaging Results   (All laboratory and radiology results have been personally reviewed by myself)  Labs:  Results for orders placed or performed in visit on 12/16/21   POCT COVID-19, Antigen   Result Value Ref Range    SARS-COV-2, POC Not-Detected Not Detected    Lot Number [id number]     QC Pass/Fail pass     Performing Instrument BinaxNOW      Imaging: All Radiology results interpreted by Radiologist unless otherwise noted. No results found. Medical Decision Making   Pt non-toxic, in no apparent distress and stable at time of discharge. Assessment/Plan   Candance Brandy was seen today for generalized body aches, fatigue and cough. Diagnoses and all orders for this visit:    Viral illness  -     POCT COVID-19, Antigen    Rapid COVID negative in office, pt advised of results. Advised to continue Amoxil as previously prescribed. Can take OTC meds for symptom relief. Increase fluids and rest. Symptomatic relief discussed including Tylenol prn pain/fever. Schedule f/u with PCP in 7-10 days if symptoms persist. ED sooner if symptoms worsen or change. ED immediately with high or refractory fever, progressive SOB, dyspnea, CP, calf pain/swelling, shaking chills, vomiting, abdominal pain, lethargy, flank pain, or decreased urinary output. Pt verbalizes understanding and is in agreement with plan of care. All questions answered. Return if symptoms worsen or fail to improve. Electronically signed by ALEX Holley CNP   DD: 12/16/21    **This report was transcribed using voice recognition software. Every effort was made to ensure accuracy; however, inadvertent computerized transcription errors may be present.

## 2022-01-10 ENCOUNTER — OFFICE VISIT (OUTPATIENT)
Dept: PRIMARY CARE CLINIC | Age: 22
End: 2022-01-10

## 2022-01-10 VITALS
HEART RATE: 78 BPM | HEIGHT: 61 IN | TEMPERATURE: 98.5 F | SYSTOLIC BLOOD PRESSURE: 130 MMHG | OXYGEN SATURATION: 98 % | BODY MASS INDEX: 33.99 KG/M2 | DIASTOLIC BLOOD PRESSURE: 86 MMHG | WEIGHT: 180 LBS

## 2022-01-10 DIAGNOSIS — R09.81 NASAL CONGESTION: Primary | ICD-10-CM

## 2022-01-10 DIAGNOSIS — R05.9 COUGH: ICD-10-CM

## 2022-01-10 LAB
Lab: NORMAL
PERFORMING INSTRUMENT: NORMAL
QC PASS/FAIL: NORMAL
SARS-COV-2, POC: NORMAL

## 2022-01-10 PROCEDURE — 99213 OFFICE O/P EST LOW 20 MIN: CPT | Performed by: FAMILY MEDICINE

## 2022-01-10 PROCEDURE — 87426 SARSCOV CORONAVIRUS AG IA: CPT | Performed by: FAMILY MEDICINE

## 2022-01-10 RX ORDER — BENZONATATE 100 MG/1
100 CAPSULE ORAL 3 TIMES DAILY PRN
Qty: 30 CAPSULE | Refills: 0 | Status: SHIPPED | OUTPATIENT
Start: 2022-01-10 | End: 2022-01-17

## 2022-01-10 NOTE — PROGRESS NOTES
Chief Complaint:  Nasal Congestion (x 4 days), Pharyngitis, and Generalized Body Aches      History of Present Illness:  Ale Hodges is a 24 y.o. old female who presents to walk-in with complaints of:  Sore throat, body aches  Also stuffiness   No fevers  +dry cough  No SOB  Started Thursday night   Christine the same since then   She is taking ibuprofen and and acetaminophen - ibuprofen is helping somewhat, but not sure about the acetaminophen   She was around a friend who had COVID but she only saw her again after that person was cleared - so no infectious contacts that she is aware of  Pt is vaccinated but not yet boosted (not due)       ROS:  Pertinent positives and negatives are stated within HPI, all other systems reviewed and are negative. Past Medical History:  has a past medical history of Diabetes mellitus ([facility] [address] and Hypothyroid. Past Surgical History:  has no past surgical history on file. Social History:  reports that she has never smoked. She has never used smokeless tobacco. She reports that she does not drink alcohol and does not use drugs. Family History: family history is not on file. Allergies: Patient has no known allergies. Physical Exam:  Vital Signs:  /86   Pulse 78   Temp 98.5 °F (36.9 °C)   Ht 5' 1\" (1.549 m)   Wt 180 lb (81.6 kg)   SpO2 98%   BMI 34.01 kg/m²        Constitutional:  Alert, development consistent with age. NAD. Head:  NC/NT. Airway patent. Ears: TMs WNL bilaterally. Canals without exudate or swelling bilaterally. Mouth: Posterior pharynx with mild erythema and clear postnasal drip. No tonsillar hypertrophy or exudate. Neck:  Normal ROM. Supple. Lungs: CTAB without wheezes, rales, or rhonchi. CV:  Regular rate and rhythm, normal heart sounds, without pathological murmurs, ectopy, gallops, or rubs. Skin:  Normal turgor. Warm, dry, without visible rash. Neurological:  Oriented. Motor functions intact.     Lab / Imaging Results:  (All laboratory and radiology results have been personally reviewed by myself)  Labs:  No results found for this visit on 01/10/22. Imaging: All Radiology results interpreted by Radiologist unless otherwise noted. No results found. Medical Decision Making:  Pt non-toxic, in no apparent distress and stable at time of discharge. Assessment/Plan:  Rosa Batista was seen today for nasal congestion, pharyngitis and generalized body aches. Diagnoses and associated orders for this visit:     Nasal congestion   POCT COVID-19, Antigen    - COUGH     Rapid COVID negative  D/w pt possible need to retest given sx and/or exposure - would retest if no improvement, but if she's feeling better can just return to class as per note (would be outside day 5 anyway)  Updated CDC quarantine recommendations reviewed   Supportive care reviewed   Add tessalon   Pt expressed good verbal understanding        Increase fluids and rest. Symptomatic relief discussed including Tylenol prn pain/fever. Schedule f/u with PCP in 7-10 days if symptoms persist. ED sooner if symptoms worsen or change. ED immediately with high or refractory fever, progressive SOB, dyspnea, CP, calf pain/swelling, shaking chills, vomiting, abdominal pain, lethargy, flank pain, or decreased urinary output. Pt verbalizes understanding and is in agreement with plan of care. All questions answered. F/u PRN    Electronically signed by Nereida Layton MD   DD: 1/10/22    **This report was transcribed using voice recognition software. Every effort was made to ensure accuracy; however, inadvertent computerized transcription errors may be present.

## 2022-01-10 NOTE — LETTER
NOTIFICATION RETURN TO WORK / SCHOOL    1/10/2022    Ms. Lauretha Jeans Spann  [facility] [address]      To Whom It May Concern:    Bharati Lehman was tested for COVID-19 on 1/10, and the result was negative. However, she is suffering from another illness. She may return to school Thursday, Jan 13, 2022, unless she remains ill. I recommend:return without restrictions    If there are questions or concerns, please have the patient contact our office.         Sincerely,      Barney Chambers MD
.75cm healed laceration to distal mid aspect of tongue, no erythema or swelling, wound edges are approximated

## 2022-03-29 ENCOUNTER — APPOINTMENT (OUTPATIENT)
Dept: GENERAL RADIOLOGY | Age: 22
End: 2022-03-29
Payer: MEDICAID

## 2022-03-29 ENCOUNTER — HOSPITAL ENCOUNTER (EMERGENCY)
Age: 22
Discharge: HOME | End: 2022-03-29
Attending: EMERGENCY MEDICINE
Payer: MEDICAID

## 2022-03-29 VITALS
TEMPERATURE: 98.4 F | OXYGEN SATURATION: 98 % | DIASTOLIC BLOOD PRESSURE: 93 MMHG | SYSTOLIC BLOOD PRESSURE: 130 MMHG | HEART RATE: 69 BPM | BODY MASS INDEX: 35.87 KG/M2 | WEIGHT: 190 LBS | HEIGHT: 61 IN | RESPIRATION RATE: 18 BRPM

## 2022-03-29 DIAGNOSIS — J06.9 UPPER RESPIRATORY TRACT INFECTION, UNSPECIFIED TYPE: Primary | ICD-10-CM

## 2022-03-29 DIAGNOSIS — R51.9 ACUTE NONINTRACTABLE HEADACHE, UNSPECIFIED HEADACHE TYPE: ICD-10-CM

## 2022-03-29 LAB
ALBUMIN SERPL-MCNC: 4.2 G/DL (ref 3.5–5.2)
ALP BLD-CCNC: 106 U/L (ref 35–104)
ALT SERPL-CCNC: 13 U/L (ref 0–32)
ANION GAP SERPL CALCULATED.3IONS-SCNC: 17 MMOL/L (ref 7–16)
AST SERPL-CCNC: 15 U/L (ref 0–31)
BASOPHILS ABSOLUTE: 0.04 E9/L (ref 0–0.2)
BASOPHILS RELATIVE PERCENT: 0.6 % (ref 0–2)
BILIRUB SERPL-MCNC: 0.3 MG/DL (ref 0–1.2)
BILIRUBIN URINE: NEGATIVE
BLOOD, URINE: NEGATIVE
BUN BLDV-MCNC: 7 MG/DL (ref 6–20)
CALCIUM SERPL-MCNC: 9.5 MG/DL (ref 8.6–10.2)
CHLORIDE BLD-SCNC: 101 MMOL/L (ref 98–107)
CLARITY: CLEAR
CO2: 21 MMOL/L (ref 22–29)
COLOR: YELLOW
CREAT SERPL-MCNC: 0.6 MG/DL (ref 0.5–1)
EKG ATRIAL RATE: 60 BPM
EKG P AXIS: 49 DEGREES
EKG P-R INTERVAL: 140 MS
EKG Q-T INTERVAL: 378 MS
EKG QRS DURATION: 78 MS
EKG QTC CALCULATION (BAZETT): 378 MS
EKG R AXIS: 69 DEGREES
EKG T AXIS: 39 DEGREES
EKG VENTRICULAR RATE: 60 BPM
EOSINOPHILS ABSOLUTE: 0.04 E9/L (ref 0.05–0.5)
EOSINOPHILS RELATIVE PERCENT: 0.6 % (ref 0–6)
GFR AFRICAN AMERICAN: >60
GFR NON-AFRICAN AMERICAN: >60 ML/MIN/1.73
GLUCOSE BLD-MCNC: 93 MG/DL (ref 74–99)
GLUCOSE URINE: NEGATIVE MG/DL
HCG, URINE, POC: NEGATIVE
HCT VFR BLD CALC: 40 % (ref 34–48)
HEMOGLOBIN: 13.1 G/DL (ref 11.5–15.5)
IMMATURE GRANULOCYTES #: 0.03 E9/L
IMMATURE GRANULOCYTES %: 0.5 % (ref 0–5)
INFLUENZA A BY PCR: NOT DETECTED
INFLUENZA B BY PCR: NOT DETECTED
KETONES, URINE: NEGATIVE MG/DL
LEUKOCYTE ESTERASE, URINE: NEGATIVE
LYMPHOCYTES ABSOLUTE: 2.35 E9/L (ref 1.5–4)
LYMPHOCYTES RELATIVE PERCENT: 35.9 % (ref 20–42)
Lab: NORMAL
MCH RBC QN AUTO: 27.2 PG (ref 26–35)
MCHC RBC AUTO-ENTMCNC: 32.8 % (ref 32–34.5)
MCV RBC AUTO: 83 FL (ref 80–99.9)
MONOCYTES ABSOLUTE: 0.36 E9/L (ref 0.1–0.95)
MONOCYTES RELATIVE PERCENT: 5.5 % (ref 2–12)
NEGATIVE QC PASS/FAIL: NORMAL
NEUTROPHILS ABSOLUTE: 3.73 E9/L (ref 1.8–7.3)
NEUTROPHILS RELATIVE PERCENT: 56.9 % (ref 43–80)
NITRITE, URINE: NEGATIVE
PDW BLD-RTO: 12.3 FL (ref 11.5–15)
PH UA: 6 (ref 5–9)
PLATELET # BLD: 329 E9/L (ref 130–450)
PMV BLD AUTO: 9.7 FL (ref 7–12)
POSITIVE QC PASS/FAIL: NORMAL
POTASSIUM REFLEX MAGNESIUM: 4.1 MMOL/L (ref 3.5–5)
PROTEIN UA: NEGATIVE MG/DL
RBC # BLD: 4.82 E12/L (ref 3.5–5.5)
SARS-COV-2, NAAT: NOT DETECTED
SODIUM BLD-SCNC: 139 MMOL/L (ref 132–146)
SPECIFIC GRAVITY UA: 1.02 (ref 1–1.03)
TOTAL PROTEIN: 7.8 G/DL (ref 6.4–8.3)
TROPONIN, HIGH SENSITIVITY: <6 NG/L (ref 0–9)
UROBILINOGEN, URINE: 0.2 E.U./DL
WBC # BLD: 6.6 E9/L (ref 4.5–11.5)

## 2022-03-29 PROCEDURE — 93010 ELECTROCARDIOGRAM REPORT: CPT | Performed by: INTERNAL MEDICINE

## 2022-03-29 PROCEDURE — 87635 SARS-COV-2 COVID-19 AMP PRB: CPT

## 2022-03-29 PROCEDURE — 93005 ELECTROCARDIOGRAM TRACING: CPT | Performed by: PHYSICIAN ASSISTANT

## 2022-03-29 PROCEDURE — 85025 COMPLETE CBC W/AUTO DIFF WBC: CPT

## 2022-03-29 PROCEDURE — 99285 EMERGENCY DEPT VISIT HI MDM: CPT

## 2022-03-29 PROCEDURE — 84484 ASSAY OF TROPONIN QUANT: CPT

## 2022-03-29 PROCEDURE — 71046 X-RAY EXAM CHEST 2 VIEWS: CPT

## 2022-03-29 PROCEDURE — 87502 INFLUENZA DNA AMP PROBE: CPT

## 2022-03-29 PROCEDURE — 6370000000 HC RX 637 (ALT 250 FOR IP): Performed by: STUDENT IN AN ORGANIZED HEALTH CARE EDUCATION/TRAINING PROGRAM

## 2022-03-29 PROCEDURE — 81003 URINALYSIS AUTO W/O SCOPE: CPT

## 2022-03-29 PROCEDURE — 80053 COMPREHEN METABOLIC PANEL: CPT

## 2022-03-29 RX ORDER — IBUPROFEN 400 MG/1
400 TABLET ORAL ONCE
Status: COMPLETED | OUTPATIENT
Start: 2022-03-29 | End: 2022-03-29

## 2022-03-29 RX ORDER — IBUPROFEN 600 MG/1
600 TABLET ORAL 4 TIMES DAILY PRN
Qty: 120 TABLET | Refills: 0 | Status: SHIPPED | OUTPATIENT
Start: 2022-03-29

## 2022-03-29 RX ADMIN — IBUPROFEN 400 MG: 400 TABLET, FILM COATED ORAL at 14:37

## 2022-03-29 ASSESSMENT — ENCOUNTER SYMPTOMS
BACK PAIN: 0
SHORTNESS OF BREATH: 1
ABDOMINAL DISTENTION: 0
ABDOMINAL PAIN: 0
CHOKING: 0
NAUSEA: 0
VOMITING: 0
DIARRHEA: 0
CHEST TIGHTNESS: 0
COUGH: 0
SORE THROAT: 0

## 2022-03-29 ASSESSMENT — PAIN SCALES - GENERAL
PAINLEVEL_OUTOF10: 2
PAINLEVEL_OUTOF10: 8

## 2022-03-29 NOTE — ED PROVIDER NOTES
HPI     Patient is a 24 y.o. female presents with a chief complaint of headache and chest pain. This has been occurring for 4 days. Patient states that it gets better with time. Patient states that it gets worse with nothing. Patient states that it is moderate in severity. Patient states it was acute in onset. Patient presents for headache and chest pain. This has been going on since Thursday. Patient with exposure to Covid. Patient stated that she also has a headache. Does not know the time that it started. It is consistent with previous headaches. Patient took Tylenol initially that improved her symptoms. Patient also stated that she feels short of breath. Patient denies any current chest pain. Patient denies any fevers or chills. Patient denies any nausea vomiting. Patient has had no changes in urinary or bowel habits. Review of Systems   Constitutional: Negative for activity change, appetite change, chills, fatigue and fever. HENT: Negative for congestion, drooling and sore throat. Respiratory: Positive for shortness of breath. Negative for cough, choking and chest tightness. Cardiovascular: Positive for chest pain. Negative for palpitations. Gastrointestinal: Negative for abdominal distention, abdominal pain, diarrhea, nausea and vomiting. Genitourinary: Negative for decreased urine volume, difficulty urinating, enuresis, flank pain, frequency and hematuria. Musculoskeletal: Negative for arthralgias, back pain and neck stiffness. Skin: Negative for rash and wound. Neurological: Positive for headaches. Negative for dizziness, facial asymmetry and light-headedness. Psychiatric/Behavioral: Negative for agitation, confusion and decreased concentration. Physical Exam  Vitals and nursing note reviewed. Constitutional:       Appearance: She is well-developed. HENT:      Head: Normocephalic and atraumatic.    Eyes:      Conjunctiva/sclera: Conjunctivae normal. Cardiovascular:      Rate and Rhythm: Normal rate and regular rhythm. Heart sounds: Normal heart sounds. No murmur heard. Pulmonary:      Effort: Pulmonary effort is normal. No respiratory distress. Breath sounds: Normal breath sounds. No wheezing or rales. Abdominal:      General: Bowel sounds are normal.      Palpations: Abdomen is soft. Tenderness: There is no abdominal tenderness. There is no guarding or rebound. Musculoskeletal:      Cervical back: Normal range of motion and neck supple. Skin:     General: Skin is warm and dry. Neurological:      Mental Status: She is alert and oriented to person, place, and time. Cranial Nerves: No cranial nerve deficit. Coordination: Coordination normal.      Comments: Patient has negative Kernig sign. Patient has no neck tenderness. Procedures   EKG read interpreted by me. Rate 60 bpm.  Normal axis. Normal sinus rhythm. Normal VT interval.  Normal QRS. No ST elevations or depression. MDM         Patient is a 24 y.o. female presenting with headache and chest pain. Patient has no signs of of possible meningitis. Patient has no neck tenderness. Patient is afebrile. Patient's headache was slow in onset. Same quality as previous headaches. Patient has no neurological deficits. Patient is PERC negative. Patient had a chest x-ray that was benign. Patient had a Covid test that was negative. Patient's other lab work is benign. On final reevaluation, patient once again is resting comfortably. Remains hemodynamically stable, afebrile, no nuchal rigidity, no meningeal signs, NIH is 0, overall well-appearing. No focal deficits. Patient symptoms are consistent with URI. Patient be discharged home. Patient was given return precautions. Patient will follow up with their primary care provider. Patient is agreeable to this plan. Patient has remained stable throughout their stay in the ED.        Patient was seen and evaluated by myself and my attending Skyla Zaragoza MD. Assessment and Plan discussed with attending provider, please see attestation for final plan of care. This note was done using dictation software and there may be some grammatical errors associated with this. Garrie Ormond, MD              --------------------------------------------- PAST HISTORY ---------------------------------------------  Past Medical History:  has a past medical history of Diabetes mellitus ([facility] [address] and Hypothyroid. Past Surgical History:  has no past surgical history on file. Social History:  reports that she has never smoked. She has never used smokeless tobacco. She reports that she does not drink alcohol and does not use drugs. Family History: family history is not on file. The patients home medications have been reviewed. Allergies: Patient has no known allergies.     -------------------------------------------------- RESULTS -------------------------------------------------  Labs:  Results for orders placed or performed during the hospital encounter of 03/29/22   RAPID INFLUENZA A/B ANTIGENS    Specimen: Nasopharyngeal   Result Value Ref Range    Influenza A by PCR Not Detected Not Detected    Influenza B by PCR Not Detected Not Detected   COVID-19, Rapid    Specimen: Nasopharyngeal Swab   Result Value Ref Range    SARS-CoV-2, NAAT Not Detected Not Detected   CBC with Auto Differential   Result Value Ref Range    WBC 6.6 4.5 - 11.5 E9/L    RBC 4.82 3.50 - 5.50 E12/L    Hemoglobin 13.1 11.5 - 15.5 g/dL    Hematocrit 40.0 34.0 - 48.0 %    MCV 83.0 80.0 - 99.9 fL    MCH 27.2 26.0 - 35.0 pg    MCHC 32.8 32.0 - 34.5 %    RDW 12.3 11.5 - 15.0 fL    Platelets 046 986 - 714 E9/L    MPV 9.7 7.0 - 12.0 fL    Neutrophils % 56.9 43.0 - 80.0 %    Immature Granulocytes % 0.5 0.0 - 5.0 %    Lymphocytes % 35.9 20.0 - 42.0 %    Monocytes % 5.5 2.0 - 12.0 %    Eosinophils % 0.6 0.0 - 6.0 %    Basophils % 0.6 0.0 - 2.0 % Neutrophils Absolute 3.73 1.80 - 7.30 E9/L    Immature Granulocytes # 0.03 E9/L    Lymphocytes Absolute 2.35 1.50 - 4.00 E9/L    Monocytes Absolute 0.36 0.10 - 0.95 E9/L    Eosinophils Absolute 0.04 (L) 0.05 - 0.50 E9/L    Basophils Absolute 0.04 0.00 - 0.20 E9/L   Comprehensive Metabolic Panel w/ Reflex to MG   Result Value Ref Range    Sodium 139 132 - 146 mmol/L    Potassium reflex Magnesium 4.1 3.5 - 5.0 mmol/L    Chloride 101 98 - 107 mmol/L    CO2 21 (L) 22 - 29 mmol/L    Anion Gap 17 (H) 7 - 16 mmol/L    Glucose 93 74 - 99 mg/dL    BUN 7 6 - 20 mg/dL    CREATININE 0.6 0.5 - 1.0 mg/dL    GFR Non-African American >60 >=60 mL/min/1.73    GFR African American >60     Calcium 9.5 8.6 - 10.2 mg/dL    Total Protein 7.8 6.4 - 8.3 g/dL    Albumin 4.2 3.5 - 5.2 g/dL    Total Bilirubin 0.3 0.0 - 1.2 mg/dL    Alkaline Phosphatase 106 (H) 35 - 104 U/L    ALT 13 0 - 32 U/L    AST 15 0 - 31 U/L   Troponin   Result Value Ref Range    Troponin, High Sensitivity <6 0 - 9 ng/L   Urinalysis   Result Value Ref Range    Color, UA Yellow Straw/Yellow    Clarity, UA Clear Clear    Glucose, Ur Negative Negative mg/dL    Bilirubin Urine Negative Negative    Ketones, Urine Negative Negative mg/dL    Specific Gravity, UA 1.025 1.005 - 1.030    Blood, Urine Negative Negative    pH, UA 6.0 5.0 - 9.0    Protein, UA Negative Negative mg/dL    Urobilinogen, Urine 0.2 <2.0 E.U./dL    Nitrite, Urine Negative Negative    Leukocyte Esterase, Urine Negative Negative   POC Pregnancy Urine Qual   Result Value Ref Range    HCG, Urine, POC Negative Negative    Lot Number [id number]     Positive QC Pass/Fail Pass     Negative QC Pass/Fail Pass    EKG 12 Lead   Result Value Ref Range    Ventricular Rate 60 BPM    Atrial Rate 60 BPM    P-R Interval 140 ms    QRS Duration 78 ms    Q-T Interval 378 ms    QTc Calculation (Bazett) 378 ms    P Axis 49 degrees    R Axis 69 degrees    T Axis 39 degrees       Radiology:  XR CHEST (2 VW)   Final Result   No acute process. ------------------------- NURSING NOTES AND VITALS REVIEWED ---------------------------  Date / Time Roomed:  3/29/2022  1:41 PM  ED Bed Assignment:  [facility]    The nursing notes within the ED encounter and vital signs as below have been reviewed. BP (!) 130/93   Pulse 69   Temp 98.4 °F (36.9 °C) (Oral)   Resp 18   Ht 5' 1\" (1.549 m)   Wt 190 lb (86.2 kg)   LMP 03/10/2022   SpO2 98%   BMI 35.90 kg/m²   Oxygen Saturation Interpretation: Normal      ------------------------------------------ PROGRESS NOTES ------------------------------------------  2:49 PM EDT  I have spoken with the patient and family if present and discussed todays results, in addition to providing specific details for the plan of care and counseling regarding the diagnosis and prognosis. Their questions are answered at this time and they are agreeable with the plan. I discussed at length with them reasons for immediate return here for re evaluation. They will followup with their primary care provider by calling their office as soon as possible.      --------------------------------- ADDITIONAL PROVIDER NOTES ---------------------------------  At this time the patient is without objective evidence of an acute process requiring hospitalization or inpatient management. They have remained hemodynamically stable throughout their entire ED visit and are stable for discharge with outpatient follow-up. The plan has been discussed in detail and they are aware of the specific conditions for emergent return, as well as the importance of follow-up. New Prescriptions    IBUPROFEN (ADVIL;MOTRIN) 600 MG TABLET    Take 1 tablet by mouth 4 times daily as needed for Pain       Diagnosis:  1. Upper respiratory tract infection, unspecified type    2. Acute nonintractable headache, unspecified headache type        Disposition:  Patient's disposition: Discharge to home  Patient's condition is stable.        López Fleming, MD  Resident  03/29/22 3076      ATTENDING PROVIDER ATTESTATION:     Rosalinda Bhatt presented to the emergency department for evaluation of Headache (dizziness, cp and sob, bodyaches since Thursday ) and Chest Pain   and was initially evaluated by the Medical Resident. See Original ED Note for H&P and ED course above. I have reviewed and discussed the case, including pertinent history (medical, surgical, family and social) and exam findings with the Medical Resident assigned to Rosalinda Bhatt. I have personally performed and/or participated in the history, exam, medical decision making, and procedures and agree with all pertinent clinical information. I have reviewed my findings and recommendations with the assigned Medical Resident, Rosalinda Bhatt and members of family present at the time of disposition. My findings/plan: The primary encounter diagnosis was Upper respiratory tract infection, unspecified type. A diagnosis of Acute nonintractable headache, unspecified headache type was also pertinent to this visit.   Discharge Medication List as of 3/29/2022  2:42 PM      START taking these medications    Details   ibuprofen (ADVIL;MOTRIN) 600 MG tablet Take 1 tablet by mouth 4 times daily as needed for Pain, Disp-120 tablet, R-0Print           MD Mayra Bales MD  03/30/22 3017

## 2022-03-29 NOTE — ED NOTES
Department of Emergency Medicine    FIRST PROVIDER TRIAGE NOTE             Independent MLP           3/29/22  10:30 AM EDT    Date of Encounter: 3/29/22   MRN: [id number]    Vitals:    03/29/22 1009 03/29/22 1041   BP:  (!) 130/93   Pulse: 69    Resp:  18   SpO2: 98%    Weight:  190 lb (86.2 kg)   Height:  5' 1\" (1.549 m)      HPI: Elvira Perla is a 24 y.o. female who presents to the ED for Headache (dizziness, cp and sob, bodyaches since Thursday )  Patient  Reports possible COVID contact at [facility]: Negative for abd pain or urinary complaints. Physical Exam:   Gen Appearance/Constitutional: alert  CV: regular rate     Initial Plan of Care: All treatment areas with department are currently occupied. Plan to order/Initiate the following while awaiting opening in ED: labs, EKG and imaging studies.     Initial Plan of Care: Initiate Treatment-Testing, Proceed toTreatment Area When Bed Available for ED Attending/MLP to Continue Care    Electronically signed by Jennifer Correa PA-C   DD: 3/29/22       Jennifer Correa PA-C  03/29/22 1042

## 2022-03-31 ENCOUNTER — APPOINTMENT (OUTPATIENT)
Dept: CT IMAGING | Age: 22
End: 2022-03-31
Payer: MEDICAID

## 2022-03-31 ENCOUNTER — HOSPITAL ENCOUNTER (EMERGENCY)
Age: 22
Discharge: HOME | End: 2022-03-31
Payer: MEDICAID

## 2022-03-31 VITALS
DIASTOLIC BLOOD PRESSURE: 97 MMHG | RESPIRATION RATE: 16 BRPM | HEART RATE: 86 BPM | TEMPERATURE: 97 F | SYSTOLIC BLOOD PRESSURE: 133 MMHG | OXYGEN SATURATION: 95 %

## 2022-03-31 DIAGNOSIS — J01.00 ACUTE NON-RECURRENT MAXILLARY SINUSITIS: Primary | ICD-10-CM

## 2022-03-31 DIAGNOSIS — Z76.0 ENCOUNTER FOR MEDICATION REFILL: ICD-10-CM

## 2022-03-31 LAB
ALBUMIN SERPL-MCNC: 4.3 G/DL (ref 3.5–5.2)
ALP BLD-CCNC: 109 U/L (ref 35–104)
ALT SERPL-CCNC: 11 U/L (ref 0–32)
ANION GAP SERPL CALCULATED.3IONS-SCNC: 11 MMOL/L (ref 7–16)
AST SERPL-CCNC: 12 U/L (ref 0–31)
BASOPHILS ABSOLUTE: 0.03 E9/L (ref 0–0.2)
BASOPHILS RELATIVE PERCENT: 0.4 % (ref 0–2)
BILIRUB SERPL-MCNC: 0.2 MG/DL (ref 0–1.2)
BUN BLDV-MCNC: 7 MG/DL (ref 6–20)
CALCIUM SERPL-MCNC: 9.4 MG/DL (ref 8.6–10.2)
CHLORIDE BLD-SCNC: 102 MMOL/L (ref 98–107)
CO2: 25 MMOL/L (ref 22–29)
CREAT SERPL-MCNC: 0.6 MG/DL (ref 0.5–1)
EOSINOPHILS ABSOLUTE: 0.06 E9/L (ref 0.05–0.5)
EOSINOPHILS RELATIVE PERCENT: 0.9 % (ref 0–6)
GFR AFRICAN AMERICAN: >60
GFR NON-AFRICAN AMERICAN: >60 ML/MIN/1.73
GLUCOSE BLD-MCNC: 156 MG/DL (ref 74–99)
HCG, URINE, POC: NEGATIVE
HCT VFR BLD CALC: 39 % (ref 34–48)
HEMOGLOBIN: 12.8 G/DL (ref 11.5–15.5)
IMMATURE GRANULOCYTES #: 0.04 E9/L
IMMATURE GRANULOCYTES %: 0.6 % (ref 0–5)
LYMPHOCYTES ABSOLUTE: 2.02 E9/L (ref 1.5–4)
LYMPHOCYTES RELATIVE PERCENT: 29.5 % (ref 20–42)
Lab: NORMAL
MCH RBC QN AUTO: 27.5 PG (ref 26–35)
MCHC RBC AUTO-ENTMCNC: 32.8 % (ref 32–34.5)
MCV RBC AUTO: 83.7 FL (ref 80–99.9)
MONOCYTES ABSOLUTE: 0.43 E9/L (ref 0.1–0.95)
MONOCYTES RELATIVE PERCENT: 6.3 % (ref 2–12)
NEGATIVE QC PASS/FAIL: NORMAL
NEUTROPHILS ABSOLUTE: 4.27 E9/L (ref 1.8–7.3)
NEUTROPHILS RELATIVE PERCENT: 62.3 % (ref 43–80)
PDW BLD-RTO: 12.3 FL (ref 11.5–15)
PLATELET # BLD: 335 E9/L (ref 130–450)
PMV BLD AUTO: 9.7 FL (ref 7–12)
POSITIVE QC PASS/FAIL: NORMAL
POTASSIUM SERPL-SCNC: 4.1 MMOL/L (ref 3.5–5)
RBC # BLD: 4.66 E12/L (ref 3.5–5.5)
SODIUM BLD-SCNC: 138 MMOL/L (ref 132–146)
TOTAL PROTEIN: 7.9 G/DL (ref 6.4–8.3)
WBC # BLD: 6.9 E9/L (ref 4.5–11.5)

## 2022-03-31 PROCEDURE — 6370000000 HC RX 637 (ALT 250 FOR IP): Performed by: PHYSICIAN ASSISTANT

## 2022-03-31 PROCEDURE — 99284 EMERGENCY DEPT VISIT MOD MDM: CPT

## 2022-03-31 PROCEDURE — 85025 COMPLETE CBC W/AUTO DIFF WBC: CPT

## 2022-03-31 PROCEDURE — 70450 CT HEAD/BRAIN W/O DYE: CPT

## 2022-03-31 PROCEDURE — 80053 COMPREHEN METABOLIC PANEL: CPT

## 2022-03-31 RX ORDER — MECLIZINE HCL 12.5 MG/1
25 TABLET ORAL ONCE
Status: COMPLETED | OUTPATIENT
Start: 2022-03-31 | End: 2022-03-31

## 2022-03-31 RX ORDER — FLUTICASONE PROPIONATE 50 MCG
1 SPRAY, SUSPENSION (ML) NASAL DAILY
Qty: 16 G | Refills: 0 | Status: SHIPPED | OUTPATIENT
Start: 2022-03-31

## 2022-03-31 RX ORDER — AMOXICILLIN AND CLAVULANATE POTASSIUM 875; 125 MG/1; MG/1
1 TABLET, FILM COATED ORAL 2 TIMES DAILY WITH MEALS
Qty: 20 TABLET | Refills: 0 | Status: SHIPPED | OUTPATIENT
Start: 2022-03-31 | End: 2022-04-10

## 2022-03-31 RX ADMIN — MECLIZINE 25 MG: 12.5 TABLET ORAL at 11:01

## 2022-03-31 NOTE — ED NOTES
Department of Emergency Medicine  FIRST PROVIDER TRIAGE NOTE             Independent MLP           3/31/22  10:29 AM EDT    Date of Encounter: 3/31/22   MRN: [id number]      HPI: Bobby Young is a 24 y.o. female who presents to the ED for Dizziness (pt reports dizziness for 6 days. pt was seen 2 days ago for same. pt has not followed up with pcp)    Patient is a 57-year-old is presenting with dizziness. Patient states feels like \"the room is spinning. \"  Patient was seen and evaluated for a cough and upper respiratory things a few days ago and found to be completely negative. Patient did not have a head CT. Patient states \"I blew my nose very hard and feel I am a have dislodged something. \"    ROS: Negative for cp, sob, abd pain, back pain, fever or cough. PE: Gen Appearance/Constitutional: alert  HEENT: NC/NT. PERRLA,  Airway patent. Neck: supple     Initial Plan of Care: All treatment areas with department are currently occupied. Plan to order/Initiate the following while awaiting opening in ED: labs and imaging studies.   Initiate Treatment-Testing, Proceed toTreatment Area When Bed Available for ED Attending/MLP to Continue Care    Electronically signed by Sergio Marino PA-C   DD: 3/31/22         Sergio Marino PA-C  03/31/22 [id number]

## 2022-03-31 NOTE — ED PROVIDER NOTES
[address]  Department of Emergency Medicine   ED  Encounter Note  Admit Date/RoomTime: 3/31/2022  3:59 PM  ED Room: [facility]    NAME: Alcide Libman  : 2000  MRN: [id number]     Chief Complaint:  Dizziness (pt reports dizziness for 6 days. pt was seen 2 days ago for same. pt has not followed up with pcp)    History of Present Illness       Alcide Libman is a 24 y.o. old female who presents to the emergency department by private vehicle, for lightheadedness, which began a few day(s) prior to arrival.  Since onset the symptoms have been stable and mild-moderate in severity. The symptoms are associated with nasal and facial congestion. She also notes she has been out of her zoloft for over 1 week. There has been no chest pain, shortness of breath, extremity weakness or numbness. PERC Rule for PE for Age <50:      Age ? 50 negative     HR ? 100 negative     O2 Sat on Room Air < 95% negative     Prior History of DVT/PE negative     Recent Trauma or Surgery negative     Hemoptysis negative     Exogenous Estrogen/Hormone Use negative     Unilateral Extgremity Swelling negative     * If ANY Criteria are positive, the PERC rule is not satisfied and cannot be used to rule out PE in this patient. ROS   Pertinent positives and negatives are stated within HPI, all other systems reviewed and are negative. Past Medical History:  has a past medical history of Diabetes mellitus ([address] and Hypothyroid. Surgical History:  has no past surgical history on file. Social History:  reports that she has never smoked. She has never used smokeless tobacco. She reports that she does not drink alcohol and does not use drugs. Family History: family history is not on file. Allergies: Patient has no known allergies. Physical Exam   Oxygen Saturation Interpretation: Normal on room air analysis.         ED Triage Vitals   BP Temp Temp src Pulse Resp SpO2 Height Weight   22 1026 03/31/22 1026 -- 03/31/22 0956 03/31/22 1026 03/31/22 0956 -- --   (!) 133/97 97 °F (36.1 °C)  86 16 95 %           · Constitutional:  Alert, development consistent with age. · Ears:  External Ears: Bilateral normal.               TM's & External Canals: normal TM's and external ear canals bilaterally. · Nose:   There is purulent rhinorrhea. · Sinuses: moderate Bilateral maxillary sinus tenderness. no Bilateral frontal sinus tenderness. · Mouth:  normal tongue and buccal mucosa. · Throat: no erythema or exudates noted. Teeth and gums normal..  Airway Patent. · Neck:  Supple. No meningeal signs. There is no  preauricular, anterior cervical and posterior cervical node tenderness. · Respiratory:   Breath sounds: Bilateral normal.  Lung sounds: normal.   · CV:  Regular rate and rhythm, normal heart sounds, without pathological murmurs, ectopy, gallops, or rubs. · GI:  Abdomen Soft, nontender, good bowel sounds. No firm or pulsatile mass. · Integument:  Normal turgor. Warm, dry, without visible rash. · Neurological:  Oriented. Motor functions intact. 5/5 strength upper/lower extremities bilaterally.     Lab / Imaging Results   (All laboratory and radiology results have been personally reviewed by myself)  Labs:  Results for orders placed or performed during the hospital encounter of 03/31/22   CBC with Auto Differential   Result Value Ref Range    WBC 6.9 4.5 - 11.5 E9/L    RBC 4.66 3.50 - 5.50 E12/L    Hemoglobin 12.8 11.5 - 15.5 g/dL    Hematocrit 39.0 34.0 - 48.0 %    MCV 83.7 80.0 - 99.9 fL    MCH 27.5 26.0 - 35.0 pg    MCHC 32.8 32.0 - 34.5 %    RDW 12.3 11.5 - 15.0 fL    Platelets 889 109 - 763 E9/L    MPV 9.7 7.0 - 12.0 fL    Neutrophils % 62.3 43.0 - 80.0 %    Immature Granulocytes % 0.6 0.0 - 5.0 %    Lymphocytes % 29.5 20.0 - 42.0 %    Monocytes % 6.3 2.0 - 12.0 %    Eosinophils % 0.9 0.0 - 6.0 %    Basophils % 0.4 0.0 - 2.0 %    Neutrophils Absolute 4.27 1.80 - 7.30 E9/L Immature Granulocytes # 0.04 E9/L    Lymphocytes Absolute 2.02 1.50 - 4.00 E9/L    Monocytes Absolute 0.43 0.10 - 0.95 E9/L    Eosinophils Absolute 0.06 0.05 - 0.50 E9/L    Basophils Absolute 0.03 0.00 - 0.20 E9/L   Comprehensive Metabolic Panel   Result Value Ref Range    Sodium 138 132 - 146 mmol/L    Potassium 4.1 3.5 - 5.0 mmol/L    Chloride 102 98 - 107 mmol/L    CO2 25 22 - 29 mmol/L    Anion Gap 11 7 - 16 mmol/L    Glucose 156 (H) 74 - 99 mg/dL    BUN 7 6 - 20 mg/dL    CREATININE 0.6 0.5 - 1.0 mg/dL    GFR Non-African American >60 >=60 mL/min/1.73    GFR African American >60     Calcium 9.4 8.6 - 10.2 mg/dL    Total Protein 7.9 6.4 - 8.3 g/dL    Albumin 4.3 3.5 - 5.2 g/dL    Total Bilirubin 0.2 0.0 - 1.2 mg/dL    Alkaline Phosphatase 109 (H) 35 - 104 U/L    ALT 11 0 - 32 U/L    AST 12 0 - 31 U/L   POC Pregnancy Urine Qual   Result Value Ref Range    HCG, Urine, POC Negative Negative    Lot Number [id number]     Positive QC Pass/Fail Pass     Negative QC Pass/Fail Pass        Imaging: All Radiology results interpreted by Radiologist unless otherwise noted. CT HEAD WO CONTRAST   Final Result   No acute intracranial abnormality. Significant size retention cysts in the maxillary sinuses bilaterally and   sphenoid sinus             ED Course / Medical Decision Making     Medications   meclizine (ANTIVERT) tablet 25 mg (25 mg Oral Given 3/31/22 1101)          Consults:   None    Procedures:   none    Medical Decision Making:   Patient is well appearing, afebrile. VS stable. Presents with lightheadedness and nasal/facial congestion for 6 days. Seen here a few days ago for similar. Covid negative at that time. Labs obtained, reviewed, reassuring. Ct scan of the head indicative of maxillary sinusitis. PERC negative. Patient ambulatory with no focal deficits.  Plan is for symptom control, ATB, will restart zoloft and outpatient follow up with PCP, patient educated on signs and symptoms which require emergent evaluation. Assessment     1. Acute non-recurrent maxillary sinusitis    2. Encounter for medication refill      Plan   Discharged home. Patient condition is good    New Medications     Discharge Medication List as of 3/31/2022  4:16 PM      START taking these medications    Details   fluticasone (FLONASE) 50 MCG/ACT nasal spray 1 spray by Each Nostril route daily, Disp-16 g, R-0Print      amoxicillin-clavulanate (AUGMENTIN) 875-125 MG per tablet Take 1 tablet by mouth 2 times daily (with meals) for 10 days, Disp-20 tablet, R-0Print           Electronically signed by ALEX Pal CNP   DD: 3/31/22  **This report was transcribed using voice recognition software. Every effort was made to ensure accuracy; however, inadvertent computerized transcription errors may be present.   END OF ED PROVIDER NOTE        ALEX Moss CNP  03/31/22 8110

## 2023-05-27 ENCOUNTER — HOSPITAL ENCOUNTER (EMERGENCY)
Age: 23
Discharge: HOME | End: 2023-05-27
Attending: EMERGENCY MEDICINE
Payer: MEDICAID

## 2023-05-27 VITALS
RESPIRATION RATE: 19 BRPM | OXYGEN SATURATION: 99 % | TEMPERATURE: 97.8 F | SYSTOLIC BLOOD PRESSURE: 107 MMHG | HEART RATE: 86 BPM | DIASTOLIC BLOOD PRESSURE: 71 MMHG

## 2023-05-27 DIAGNOSIS — R55 SYNCOPE, UNSPECIFIED SYNCOPE TYPE: Primary | ICD-10-CM

## 2023-05-27 DIAGNOSIS — R11.0 NAUSEA: ICD-10-CM

## 2023-05-27 LAB
ALBUMIN SERPL-MCNC: 3.9 G/DL (ref 3.5–5.2)
ALP SERPL-CCNC: 92 U/L (ref 35–104)
ALT SERPL-CCNC: 13 U/L (ref 0–32)
ANION GAP SERPL CALCULATED.3IONS-SCNC: 9 MMOL/L (ref 7–16)
AST SERPL-CCNC: 26 U/L (ref 0–31)
BACTERIA URNS QL MICRO: ABNORMAL /HPF
BASOPHILS # BLD: 0.09 E9/L (ref 0–0.2)
BASOPHILS NFR BLD: 0.8 % (ref 0–2)
BILIRUB SERPL-MCNC: 0.3 MG/DL (ref 0–1.2)
BILIRUB UR QL STRIP: NEGATIVE
BUN SERPL-MCNC: 11 MG/DL (ref 6–20)
CALCIUM SERPL-MCNC: 9.3 MG/DL (ref 8.6–10.2)
CHLORIDE SERPL-SCNC: 100 MMOL/L (ref 98–107)
CHP ED QC CHECK: NORMAL
CLARITY UR: CLEAR
CO2 SERPL-SCNC: 22 MMOL/L (ref 22–29)
COLOR UR: YELLOW
CREAT SERPL-MCNC: 0.8 MG/DL (ref 0.5–1)
EKG ATRIAL RATE: 80 BPM
EKG P AXIS: 47 DEGREES
EKG P-R INTERVAL: 158 MS
EKG Q-T INTERVAL: 356 MS
EKG QRS DURATION: 72 MS
EKG QTC CALCULATION (BAZETT): 410 MS
EKG R AXIS: 40 DEGREES
EKG T AXIS: 43 DEGREES
EKG VENTRICULAR RATE: 80 BPM
EOSINOPHIL # BLD: 0.04 E9/L (ref 0.05–0.5)
EOSINOPHIL NFR BLD: 0.4 % (ref 0–6)
EPI CELLS #/AREA URNS HPF: ABNORMAL /HPF
ERYTHROCYTE [DISTWIDTH] IN BLOOD BY AUTOMATED COUNT: 12.5 FL (ref 11.5–15)
GLUCOSE BLD-MCNC: 159 MG/DL
GLUCOSE SERPL-MCNC: 162 MG/DL (ref 74–99)
GLUCOSE UR STRIP-MCNC: NEGATIVE MG/DL
HCG, URINE, POC: NEGATIVE
HCT VFR BLD AUTO: 49.5 % (ref 34–48)
HGB BLD-MCNC: 16.3 G/DL (ref 11.5–15.5)
HGB UR QL STRIP: ABNORMAL
HYALINE CASTS #/AREA URNS LPF: ABNORMAL /LPF (ref 0–2)
IMM GRANULOCYTES # BLD: 0.13 E9/L
IMM GRANULOCYTES NFR BLD: 1.2 % (ref 0–5)
KETONES UR STRIP-MCNC: NEGATIVE MG/DL
LEUKOCYTE ESTERASE UR QL STRIP: NEGATIVE
LIPASE: 15 U/L (ref 13–60)
LYMPHOCYTES # BLD: 2.47 E9/L (ref 1.5–4)
LYMPHOCYTES NFR BLD: 22.7 % (ref 20–42)
Lab: NORMAL
MCH RBC QN AUTO: 27.6 PG (ref 26–35)
MCHC RBC AUTO-ENTMCNC: 32.9 % (ref 32–34.5)
MCV RBC AUTO: 83.9 FL (ref 80–99.9)
METER GLUCOSE: 159 MG/DL (ref 74–99)
MONOCYTES # BLD: 0.59 E9/L (ref 0.1–0.95)
MONOCYTES NFR BLD: 5.4 % (ref 2–12)
MUCOUS THREADS URNS QL MICRO: PRESENT /LPF
NEGATIVE QC PASS/FAIL: NORMAL
NEUTROPHILS # BLD: 7.54 E9/L (ref 1.8–7.3)
NEUTS SEG NFR BLD: 69.5 % (ref 43–80)
NITRITE UR QL STRIP: NEGATIVE
PH UR STRIP: 5.5 [PH] (ref 5–9)
PLATELET # BLD AUTO: 375 E9/L (ref 130–450)
PMV BLD AUTO: 10.2 FL (ref 7–12)
POSITIVE QC PASS/FAIL: NORMAL
POTASSIUM SERPL-SCNC: 5 MMOL/L (ref 3.5–5)
PROT SERPL-MCNC: 7 G/DL (ref 6.4–8.3)
PROT UR STRIP-MCNC: NEGATIVE MG/DL
RBC # BLD AUTO: 5.9 E12/L (ref 3.5–5.5)
RBC #/AREA URNS HPF: ABNORMAL /HPF (ref 0–2)
SODIUM SERPL-SCNC: 131 MMOL/L (ref 132–146)
SP GR UR STRIP: 1.01 (ref 1–1.03)
UROBILINOGEN UR STRIP-ACNC: 0.2 E.U./DL
WBC # BLD: 10.9 E9/L (ref 4.5–11.5)
WBC #/AREA URNS HPF: ABNORMAL /HPF (ref 0–5)

## 2023-05-27 PROCEDURE — 6360000002 HC RX W HCPCS: Performed by: STUDENT IN AN ORGANIZED HEALTH CARE EDUCATION/TRAINING PROGRAM

## 2023-05-27 PROCEDURE — 96361 HYDRATE IV INFUSION ADD-ON: CPT

## 2023-05-27 PROCEDURE — 99284 EMERGENCY DEPT VISIT MOD MDM: CPT

## 2023-05-27 PROCEDURE — 96374 THER/PROPH/DIAG INJ IV PUSH: CPT

## 2023-05-27 PROCEDURE — 96375 TX/PRO/DX INJ NEW DRUG ADDON: CPT

## 2023-05-27 PROCEDURE — 2580000003 HC RX 258: Performed by: STUDENT IN AN ORGANIZED HEALTH CARE EDUCATION/TRAINING PROGRAM

## 2023-05-27 PROCEDURE — 83690 ASSAY OF LIPASE: CPT

## 2023-05-27 PROCEDURE — 82962 GLUCOSE BLOOD TEST: CPT

## 2023-05-27 PROCEDURE — 81001 URINALYSIS AUTO W/SCOPE: CPT

## 2023-05-27 PROCEDURE — 85025 COMPLETE CBC W/AUTO DIFF WBC: CPT

## 2023-05-27 PROCEDURE — 93005 ELECTROCARDIOGRAM TRACING: CPT | Performed by: EMERGENCY MEDICINE

## 2023-05-27 PROCEDURE — 80053 COMPREHEN METABOLIC PANEL: CPT

## 2023-05-27 RX ORDER — PROCHLORPERAZINE EDISYLATE 5 MG/ML
5 INJECTION INTRAMUSCULAR; INTRAVENOUS ONCE
Status: COMPLETED | OUTPATIENT
Start: 2023-05-27 | End: 2023-05-27

## 2023-05-27 RX ORDER — 0.9 % SODIUM CHLORIDE 0.9 %
1000 INTRAVENOUS SOLUTION INTRAVENOUS ONCE
Status: COMPLETED | OUTPATIENT
Start: 2023-05-27 | End: 2023-05-27

## 2023-05-27 RX ORDER — ONDANSETRON 2 MG/ML
4 INJECTION INTRAMUSCULAR; INTRAVENOUS ONCE
Status: COMPLETED | OUTPATIENT
Start: 2023-05-27 | End: 2023-05-27

## 2023-05-27 RX ADMIN — SODIUM CHLORIDE 1000 ML: 9 INJECTION, SOLUTION INTRAVENOUS at 13:30

## 2023-05-27 RX ADMIN — ONDANSETRON 4 MG: 2 INJECTION INTRAMUSCULAR; INTRAVENOUS at 13:31

## 2023-05-27 RX ADMIN — PROCHLORPERAZINE EDISYLATE 5 MG: 5 INJECTION INTRAMUSCULAR; INTRAVENOUS at 18:23

## 2023-05-27 RX ADMIN — SODIUM CHLORIDE 1000 ML: 9 INJECTION, SOLUTION INTRAVENOUS at 14:28

## 2023-05-27 ASSESSMENT — PAIN - FUNCTIONAL ASSESSMENT
PAIN_FUNCTIONAL_ASSESSMENT: NONE - DENIES PAIN
PAIN_FUNCTIONAL_ASSESSMENT: NONE - DENIES PAIN

## 2023-05-27 NOTE — ED PROVIDER NOTES
included for SEP-1 Core Measure due to: Infection is not suspected        Medical Decision Making/Differential Diagnosis:    CC/HPI Summary, Social Determinants of health, Records Reviewed, DDx, testing done/not done, ED Course, Reassessment, disposition considerations/shared decision making with patient, consults, disposition:      ED Course as of 05/27/23 2027   Sat May 27, 2023   1325 EKG read and interpreted by me. Rate 80, normal sinus rhythm, normal axis, normal intervals, QTc 410, no ST elevation or depression, stable compared to previous EKG. [TO]   2795 Patient reported continued nausea. She states she was able to walk to the bathroom okay. [TO]   1851 Patient reported improvement of her symptoms on reevaluation. She is agreeable to plan for being discharged home. Patient able to ambulate with ease. [TO]      ED Course User Index  [TO] Sameer Montana, DO        Medical Decision Making  68-year-old female with history of hyperglycemia, hypothyroidism presenting for syncopal episode after donating plasma. Patient tachycardic with mild hypotension on arrival.  An EKG showed no QT prolongation, WPW, cardiac arrhythmia, heart block. Patient given IV normal saline as well as Zofran. Syncope likely related to orthostatic hypotension given the her volume loss from donating plasma. She was reevaluated after receiving IV normal saline and reported continued nausea. This improved with Compazine. Patient seem ambulatory without any difficulty. She was felt to be stable for discharge home with follow-up. Patient advised to stop donating plasma as this is her third time with this reaction. Problems Addressed:  Nausea: acute illness or injury  Syncope, unspecified syncope type: acute illness or injury    Amount and/or Complexity of Data Reviewed  Labs: ordered. Decision-making details documented in ED Course. Details: Glucose within normal limits. Urine pregnancy negative.   CBC shows hemoconcentration

## 2023-05-30 LAB
EKG ATRIAL RATE: 80 BPM
EKG P AXIS: 47 DEGREES
EKG P-R INTERVAL: 158 MS
EKG Q-T INTERVAL: 356 MS
EKG QRS DURATION: 72 MS
EKG QTC CALCULATION (BAZETT): 410 MS
EKG R AXIS: 40 DEGREES
EKG T AXIS: 43 DEGREES
EKG VENTRICULAR RATE: 80 BPM

## 2023-05-30 PROCEDURE — 93010 ELECTROCARDIOGRAM REPORT: CPT | Performed by: INTERNAL MEDICINE
